# Patient Record
Sex: MALE | Race: WHITE | Employment: UNEMPLOYED | URBAN - METROPOLITAN AREA
[De-identification: names, ages, dates, MRNs, and addresses within clinical notes are randomized per-mention and may not be internally consistent; named-entity substitution may affect disease eponyms.]

---

## 2017-04-03 ENCOUNTER — ALLIED HEALTH/NURSE VISIT (OUTPATIENT)
Dept: NEUROLOGY | Facility: CLINIC | Age: 62
End: 2017-04-03

## 2017-04-03 ENCOUNTER — OFFICE VISIT (OUTPATIENT)
Dept: NEUROLOGY | Facility: CLINIC | Age: 62
End: 2017-04-03

## 2017-04-03 VITALS — WEIGHT: 139.5 LBS | DIASTOLIC BLOOD PRESSURE: 66 MMHG | HEART RATE: 66 BPM | SYSTOLIC BLOOD PRESSURE: 117 MMHG

## 2017-04-03 DIAGNOSIS — G40.909 SEIZURE DISORDER (H): Primary | ICD-10-CM

## 2017-04-03 DIAGNOSIS — R56.9 SEIZURES (H): Primary | ICD-10-CM

## 2017-04-03 LAB — PHENYTOIN SERPL-MCNC: 7.8 MG/L (ref 10–20)

## 2017-04-03 RX ORDER — ZONISAMIDE 100 MG/1
300 CAPSULE ORAL AT BEDTIME
Qty: 90 CAPSULE | Refills: 6 | Status: SHIPPED | OUTPATIENT
Start: 2017-04-03 | End: 2017-10-27

## 2017-04-03 RX ORDER — BISACODYL 10 MG
10 SUPPOSITORY, RECTAL RECTAL DAILY PRN
COMMUNITY

## 2017-04-03 RX ORDER — LEVETIRACETAM 750 MG/1
750 TABLET ORAL 2 TIMES DAILY
COMMUNITY
End: 2017-10-30

## 2017-04-03 RX ORDER — ALENDRONATE SODIUM 70 MG/1
70 TABLET ORAL
COMMUNITY

## 2017-04-03 NOTE — MR AVS SNAPSHOT
After Visit Summary   4/3/2017    Charles Lugo    MRN: [id number]           Patient Information     Date Of Birth          1955        Visit Information        Provider Department      4/3/2017 1:00 PM Monroe Escobedo MD [facility]        Today's Diagnoses     Seizure disorder (H)    -  1      Care Instructions      Times of Days  pm         Medication Tablet Size Number of Tablets/Capsules Total Daily Dosage   Zonegran (week 1, 2) 100  1          100   Zonegran (week 3, 4) 100  2          200   Zonegran (week 5 and after) 100  3          300                                                                                               Carry this with you at all times.  CONTINUE TAKING YOUR OTHER MEDICATIONS AS PREVIOUSLY DIRECTED.      * * *Do not store medications in the bathroom.  Keep medications away from children!* * *           Follow-ups after your visit        Follow-up notes from your care team     Return in about 3 months (around 7/3/2017).      Your next 10 appointments already scheduled     Jul 17, 2017 10:00 AM CDT   Return Visit with MD [facility] ([facility]    [address], [address]  [facility] [address] [phone]              Who to contact     Please call your clinic at [phone] to:    Ask questions about your health    Make or cancel appointments    Discuss your medicines    Learn about your test results    Speak to your doctor   If you have compliments or concerns about an experience at your clinic, or if you wish to file a complaint, please contact [facility] Patient Relations at [phone] or email us at [email or web address]         Additional Information About Your Visit        MyChart Information     [employer] is an electronic gateway that provides easy, online access to your medical records. With [employer], you can request a clinic appointment, read your test results,  renew a prescription or communicate with your care team.     To sign up for MyChart visit the website at [email or web address]   You will be asked to enter the access code listed below, as well as some personal information. Please follow the directions to create your username and password.     Your access code is: [email or web address]  Expires: 2017  2:04 PM     Your access code will  in 90 days. If you need help or a new code, please contact your [facility] or call [phone] for assistance.        Care EveryWhere ID     This is your Care EveryWhere ID. This could be used by other organizations to access your [place] medical records  [id number]        Your Vitals Were     Pulse                   66            Blood Pressure from Last 3 Encounters:   17 117/66    Weight from Last 3 Encounters:   17 139 lb 8 oz (63.3 kg)              We Performed the Following     Gabapentin level     Levetiracetam level     PHENYTOIN FREE     PHENYTOIN LEVEL          Today's Medication Changes          These changes are accurate as of: 4/3/17  2:16 PM.  If you have any questions, ask your nurse or doctor.               Start taking these medicines.        Dose/Directions    zonisamide 100 MG capsule   Commonly known as:  ZONEGRAN   Used for:  Seizure disorder (H)   Started by:  Monroe Escobedo MD        Dose:  300 mg   Take 3 capsules (300 mg) by mouth At Bedtime Start with 100 mg at bedtime for 2 weeks, then 200 mg at bedtime for 2 weeks, then 300 mg at bedtime.   Quantity:  90 capsule   Refills:  6            Where to get your medicines      Some of these will need a paper prescription and others can be bought over the counter.  Ask your nurse if you have questions.     Bring a paper prescription for each of these medications     zonisamide 100 MG capsule                Primary Care Provider Office Phone # Fax #    Vanessa Wolfe [phone]       [facility]  [facility] [address]    [facility] [address]        Thank you!     Thank you for choosing [facility]  for your care. Our goal is always to provide you with excellent care. Hearing back from our patients is one way we can continue to improve our services. Please take a few minutes to complete the written survey that you may receive in the mail after your visit with us. Thank you!             Your Updated Medication List - Protect others around you: Learn how to safely use, store and throw away your medicines at www.disposemymeds.org.          This list is accurate as of: 4/3/17  2:16 PM.  Always use your most recent med list.                   Brand Name Dispense Instructions for use    alendronate 70 MG tablet    FOSAMAX     Take 70 mg by mouth every 7 days       Calcium-Vitamin D 600-400 MG-UNIT Tabs          DILANTIN 30 MG CR capsule   Generic drug:  phenytoin      Take 160 mg by mouth daily Two 30 mg tabs in the am and two 30 mg tabs along with one 100 mg tab in the hs       FERREX 150 PO          FOLIC ACID PO      Take 1 mg by mouth daily       GABAPENTIN PO      Take 600 mg by mouth 3 times daily 1.5 tablets three times a day       gentian violet 1 % solution      Take 0.5 mLs by mouth as needed       levETIRAcetam 750 MG tablet    KEPPRA     Take 750 mg by mouth 2 times daily       LEVOTHYROXINE SODIUM PO      Take 88 mcg by mouth daily       MILK OF MAGNESIA PO          PANTOPRAZOLE SODIUM PO      Take 40 mg by mouth daily       PREPARATION H 0.25-14-74.9 % rectal ointment   Generic drug:  phenylephrine-shark liver oil-mineral oil-petrolatum      Place rectally 2 times daily as needed for hemorrhoids       study - bisacodyl 10 MG Suppository    IDS# [id number]     Place 10 mg rectally daily as needed for constipation       VITAMIN C PO      Take 500 mg by mouth daily       zonisamide 100 MG capsule    ZONEGRAN    90 capsule    Take 3 capsules (300 mg) by mouth At Bedtime Start with 100 mg at  bedtime for 2 weeks, then 200 mg at bedtime for 2 weeks, then 300 mg at bedtime.

## 2017-04-03 NOTE — PATIENT INSTRUCTIONS
Times of Days  pm         Medication Tablet Size Number of Tablets/Capsules Total Daily Dosage   Zonegran (week 1, 2) 100  1          100   Zonegran (week 3, 4) 100  2          200   Zonegran (week 5 and after) 100  3          300                                                                                               Carry this with you at all times.  CONTINUE TAKING YOUR OTHER MEDICATIONS AS PREVIOUSLY DIRECTED.      * * *Do not store medications in the bathroom.  Keep medications away from children!* * *

## 2017-04-03 NOTE — MR AVS SNAPSHOT
After Visit Summary   4/3/2017    Charles Lugo    MRN: [id number]           Patient Information     Date Of Birth          1955        Visit Information        Provider Department      4/3/2017 9:30 AM [facility] EEG 3 [facility]        Today's Diagnoses     Seizures (H)    -  1       Follow-ups after your visit        Who to contact     Please call your clinic at [phone] to:    Ask questions about your health    Make or cancel appointments    Discuss your medicines    Learn about your test results    Speak to your doctor   If you have compliments or concerns about an experience at your clinic, or if you wish to file a complaint, please contact [facility] Patient Relations at [phone] or email us at [email or web address]         Additional Information About Your Visit        MyChart Information     [employer] is an electronic gateway that provides easy, online access to your medical records. With [employer], you can request a clinic appointment, read your test results, renew a prescription or communicate with your care team.     To sign up for R[employer] Reportst visit the website at [email or web address]   You will be asked to enter the access code listed below, as well as some personal information. Please follow the directions to create your username and password.     Your access code is: [email or web address]  Expires: 2017  2:04 PM     Your access code will  in 90 days. If you need help or a new code, please contact your [facility] or call [phone] for assistance.        Care EveryWhere ID     This is your Care EveryWhere ID. This could be used by other organizations to access your [place] medical records  [id number]         Blood Pressure from Last 3 Encounters:   17 117/66    Weight from Last 3 Encounters:   17 139 lb 8 oz (63.3 kg)              Today, you had the following     No orders found for  display       Primary Care Provider Office Phone # Fax #    Vanessa STEWART [facility] [phone]       [facility] [address]   [facility] [address]        Thank you!     Thank you for choosing [facility]  for your care. Our goal is always to provide you with excellent care. Hearing back from our patients is one way we can continue to improve our services. Please take a few minutes to complete the written survey that you may receive in the mail after your visit with us. Thank you!             Your Updated Medication List - Protect others around you: Learn how to safely use, store and throw away your medicines at www.disposemymeds.org.          This list is accurate as of: 4/3/17  2:04 PM.  Always use your most recent med list.                   Brand Name Dispense Instructions for use    alendronate 70 MG tablet    FOSAMAX     Take 70 mg by mouth every 7 days       Calcium-Vitamin D 600-400 MG-UNIT Tabs          DILANTIN 30 MG CR capsule   Generic drug:  phenytoin      Take 160 mg by mouth daily Two 30 mg tabs in the am and two 30 mg tabs along with one 100 mg tab in the hs       FERREX 150 PO          FOLIC ACID PO      Take 1 mg by mouth daily       GABAPENTIN PO      Take 600 mg by mouth 3 times daily 1.5 tablets three times a day       gentian violet 1 % solution      Take 0.5 mLs by mouth as needed       levETIRAcetam 750 MG tablet    KEPPRA     Take 750 mg by mouth 2 times daily       LEVOTHYROXINE SODIUM PO      Take 88 mcg by mouth daily       MILK OF MAGNESIA PO          PANTOPRAZOLE SODIUM PO      Take 40 mg by mouth daily       PREPARATION H 0.25-14-74.9 % rectal ointment   Generic drug:  phenylephrine-shark liver oil-mineral oil-petrolatum      Place rectally 2 times daily as needed for hemorrhoids       study - bisacodyl 10 MG Suppository    IDS# [id number]     Place 10 mg rectally daily as needed for constipation       VITAMIN C PO      Take 500 mg by mouth daily

## 2017-04-03 NOTE — LETTER
4/3/2017       RE: Charles Lugo  : 1955   MRN: [id number]      Dear Colleague,    Thank you for referring your patient, Charles Lugo, to the [facility] at [facility]. Please see a copy of my visit note below.    April 3, 2017         Barrett Bliss DO   [facility]   [address], [address]   [place] [address]      RE: Charles Lugo   MRN: [id number]   : 1955      Dear Dr. Bliss:      I had the pleasure of seeing your patient, Mr. Charles Lugo, at [facility] today.  As you know, this patient is a 61-year-old male with a history of severe developmental delay and a longstanding intractable seizures.  He was previously seen by Dr. Kofi Xie at [place] and  recently left the area and you have seen this patient once and he is being referred for further evaluation and management to optimize his epilepsy care.  The patient is accompanied by 2 of his sisters and 1 of the caregivers from the group home.      The patient has a history of severe developmental delay.  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  From the limited information we obtained so far he has never had good seizure control in the past.  He has a long history of intractable seizures since the age of 4 years old.  He is currently taking Dilantin, Keppra and gabapentin for his seizure control.  He is on Dilantin 100-160, Keppra 1500 mg b.i.d. and gabapentin 900 mg t.i.d.  His caregiver and sisters do not know what other anti-seizure medications he had in the past.      According to the caregiver, the patient only has 1 type of seizure.  During a seizure, his head will drop suddenly and then he may make some noises.  Then his arms will go up.  He will make fists with hands.  His eyes will roll back and sometimes he will have some leg kicking.   Then he will have convulsions with upper and lower extremities and his body.  Usually it lasts for 10 seconds to 45 seconds in duration.  At present time it happens once to twice a week.  The caregiver felt that his seizure frequency has never changed significantly for the past 5 years since she was his main caregiver.      Caregiver did not notice any significant side effects from the medications because the patient is nonverbal.  The patient did not have any MRI scan of his brain in the past and it is unclear when he had his last EEG done.      TRIGGERS FOR SEIZURES:  Unclear.      RISK FACTORS FOR SEIZURES:  He has a history of trauma with severe developmental delay after birth.  No history of stroke.  No history of brain tumor.  No history of CNS infection.  No history of febrile convulsions.      Current Outpatient Prescriptions   Medication Sig Dispense Refill     study - bisacodyl (IDS# 4779) 10 MG Suppository Place 10 mg rectally daily as needed for constipation       Magnesium Hydroxide (MILK OF MAGNESIA PO)        alendronate (FOSAMAX) 70 MG tablet Take 70 mg by mouth every 7 days       phenylephrine-shark liver oil-mineral oil-petrolatum (PREPARATION H) 0.25-14-74.9 % rectal ointment Place rectally 2 times daily as needed for hemorrhoids       Calcium Carb-Cholecalciferol (CALCIUM-VITAMIN D) 600-400 MG-UNIT TABS        phenytoin (DILANTIN) 30 MG CR capsule Take 160 mg by mouth daily Two 30 mg tabs in the am and two 30 mg tabs along with one 100 mg tab in the hs       Polysaccharide Iron Complex (FERREX 150 PO)        FOLIC ACID PO Take 1 mg by mouth daily       GABAPENTIN PO Take 600 mg by mouth 3 times daily 1.5 tablets three times a day       levETIRAcetam (KEPPRA) 750 MG tablet Take 750 mg by mouth 2 times daily       LEVOTHYROXINE SODIUM PO Take 88 mcg by mouth daily       PANTOPRAZOLE SODIUM PO Take 40 mg by mouth daily       Ascorbic Acid (VITAMIN C PO) Take 500 mg by mouth daily       gentian  violet 1 % solution Take 0.5 mLs by mouth as needed       zonisamide (ZONEGRAN) 100 MG capsule Take 3 capsules (300 mg) by mouth At Bedtime Start with 100 mg at bedtime for 2 weeks, then 200 mg at bedtime for 2 weeks, then 300 mg at bedtime. 90 capsule 6     PAST AEDs:  Unclear.    PAST MEDICAL HISTORY:   1.  Intractable epilepsy.   2.  Severe developmental delay.      PAST SURGICAL HISTORY:  Teeth extraction.      ALLERGIES:  Debrox.      FAMILY HISTORY:  Positive for bone cancer in father.  Brother has history of hypertension.      SOCIAL HISTORY:  He lives in a group home.  No education in the past.  He is nonverbal and has no interaction with surroundings.  No smoking, no drugs, no alcohol.  He has 2 sisters for his main social support.      REVIEW OF SYSTEMS:  Positive for fever.  The rest of the 12-point review of systems is negative as per the caregiver.      PREVIOUS DIAGNOSTIC TESTING:  None available.      PHYSICAL EXAMINATION:   Blood pressure 117/66, pulse 66, weight 63.3 kg (139 lb 8 oz).    GENERAL:  Normocephalic, atraumatic.   NECK:  Supple.   EXTREMITIES:  No edema.     NEUROLOGIC:  The patient is alert, nonverbal, no interaction, no eye contact.  No eye tracking. Pupils are equal, round and reactive to light.  Fundi exam unable to observe.  No facial weakness.   MOTOR EXAM:  Moving all extremities spontaneously.  Strength cannot be tested.  Deep tendon reflexes 2+ in both patellar, biceps and brachioradialis.   COORDINATION:  Sensory exam not able to perform.   GAIT:  Not tested.  The patient is in a wheelchair.      IMPRESSION:     1.  Intractable epilepsy.  This patient is a 61-year-old male with a history of intractable epilepsy since age of 4 years old.  He had birth trauma and history of severe developmental delay.  His seizures are described as generalized tonic-clonic seizures by description.  Occurs once or twice a week.  He is currently taking Dilantin 100 mg in the morning, 160 in the  evening, Keppra 1500 mg twice daily and Gabapentin 900 mg t.i.d.      His EEG today showed moderate to severe generalized slowing with intermittent generalized epileptiform activities and occasional left hemisphere focal epileptiform activities.  At this time, it is difficult to say whether the patient has generalized epilepsy versus focal epilepsy.  It will be difficult to obtain MRI scan from the patient because he is constantly moving and not following commands.  I would like to add zonisamide to his regimen to see if this can control his seizures better.      2.  Severe developmental delay, stable.      PLAN:   1.  Continue Dilantin 100-160, Keppra 1500 mg b.i.d. and Gabapentin 900 mg t.i.d.   2.  Check Keppra, Dilantin and gabapentin levels.   3.  Add Zonegran 100 mg each day at bedtime for 2 weeks, then 200 mg each day at bedtime for 2 weeks, then 300 mg each day at bedtime.   4.  Return to clinic in 3 months.      Sincerely,       70 min was spent on the visit.  Over 50% of the time was spent on education, counseling about optimal seizure control and coordination of care.         JOAQUIM BROOKS MD             D: 2017 14:40   T: 2017 15:45   MT: nh      Name:     NUSRAT GU   MRN:      [id number]        Account:      [id number]   :      1955           Service Date: 2017      Document: [id number]

## 2017-04-03 NOTE — PROGRESS NOTES
April 3, 2017         Barrett Bliss DO   [facility] - Neurology   [address], [address]   [place] [address]      RE: Charles Lugo   MRN: [id number]   : 1955      Dear Dr. Bliss:      I had the pleasure of seeing your patient, Mr. Chalres Lugo, at [facility] today.  As you know, this patient is a 61-year-old male with a history of severe developmental delay and a longstanding intractable seizures.  He was previously seen by Dr. Kofi Xie at [place] and  recently left the area and you have seen this patient once and he is being referred for further evaluation and management to optimize his epilepsy care.  The patient is accompanied by 2 of his sisters and 1 of the caregivers from the group home.      The patient has a history of severe developmental delay.  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  From the limited information we obtained so far he has never had good seizure control in the past.  He has a long history of intractable seizures since the age of 4 years old.  He is currently taking Dilantin, Keppra and gabapentin for his seizure control.  He is on Dilantin 100-160, Keppra 1500 mg b.i.d. and gabapentin 900 mg t.i.d.  His caregiver and sisters do not know what other anti-seizure medications he had in the past.      According to the caregiver, the patient only has 1 type of seizure.  During a seizure, his head will drop suddenly and then he may make some noises.  Then his arms will go up.  He will make fists with hands.  His eyes will roll back and sometimes he will have some leg kicking.  Then he will have convulsions with upper and lower extremities and his body.  Usually it lasts for 10 seconds to 45 seconds in duration.  At present time it happens once to twice a week.  The caregiver felt that his seizure frequency has never changed significantly for the  past 5 years since she was his main caregiver.      Caregiver did not notice any significant side effects from the medications because the patient is nonverbal.  The patient did not have any MRI scan of his brain in the past and it is unclear when he had his last EEG done.      TRIGGERS FOR SEIZURES:  Unclear.      RISK FACTORS FOR SEIZURES:  He has a history of trauma with severe developmental delay after birth.  No history of stroke.  No history of brain tumor.  No history of CNS infection.  No history of febrile convulsions.      Current Outpatient Prescriptions   Medication Sig Dispense Refill     study - bisacodyl (IDS# 4779) 10 MG Suppository Place 10 mg rectally daily as needed for constipation       Magnesium Hydroxide (MILK OF MAGNESIA PO)        alendronate (FOSAMAX) 70 MG tablet Take 70 mg by mouth every 7 days       phenylephrine-shark liver oil-mineral oil-petrolatum (PREPARATION H) 0.25-14-74.9 % rectal ointment Place rectally 2 times daily as needed for hemorrhoids       Calcium Carb-Cholecalciferol (CALCIUM-VITAMIN D) 600-400 MG-UNIT TABS        phenytoin (DILANTIN) 30 MG CR capsule Take 160 mg by mouth daily Two 30 mg tabs in the am and two 30 mg tabs along with one 100 mg tab in the hs       Polysaccharide Iron Complex (FERREX 150 PO)        FOLIC ACID PO Take 1 mg by mouth daily       GABAPENTIN PO Take 600 mg by mouth 3 times daily 1.5 tablets three times a day       levETIRAcetam (KEPPRA) 750 MG tablet Take 750 mg by mouth 2 times daily       LEVOTHYROXINE SODIUM PO Take 88 mcg by mouth daily       PANTOPRAZOLE SODIUM PO Take 40 mg by mouth daily       Ascorbic Acid (VITAMIN C PO) Take 500 mg by mouth daily       gentian violet 1 % solution Take 0.5 mLs by mouth as needed       zonisamide (ZONEGRAN) 100 MG capsule Take 3 capsules (300 mg) by mouth At Bedtime Start with 100 mg at bedtime for 2 weeks, then 200 mg at bedtime for 2 weeks, then 300 mg at bedtime. 90 capsule 6     PAST AEDs:   Unclear.    PAST MEDICAL HISTORY:   1.  Intractable epilepsy.   2.  Severe developmental delay.      PAST SURGICAL HISTORY:  Teeth extraction.      ALLERGIES:  Debrox.      FAMILY HISTORY:  Positive for bone cancer in father.  Brother has history of hypertension.      SOCIAL HISTORY:  He lives in a group home.  No education in the past.  He is nonverbal and has no interaction with surroundings.  No smoking, no drugs, no alcohol.  He has 2 sisters for his main social support.      REVIEW OF SYSTEMS:  Positive for fever.  The rest of the 12-point review of systems is negative as per the caregiver.      PREVIOUS DIAGNOSTIC TESTING:  None available.      PHYSICAL EXAMINATION:   Blood pressure 117/66, pulse 66, weight 63.3 kg (139 lb 8 oz).    GENERAL:  Normocephalic, atraumatic.   NECK:  Supple.   EXTREMITIES:  No edema.     NEUROLOGIC:  The patient is alert, nonverbal, no interaction, no eye contact.  No eye tracking. Pupils are equal, round and reactive to light.  Fundi exam unable to observe.  No facial weakness.   MOTOR EXAM:  Moving all extremities spontaneously.  Strength cannot be tested.  Deep tendon reflexes 2+ in both patellar, biceps and brachioradialis.   COORDINATION:  Sensory exam not able to perform.   GAIT:  Not tested.  The patient is in a wheelchair.      IMPRESSION:     1.  Intractable epilepsy.  This patient is a 61-year-old male with a history of intractable epilepsy since age of 4 years old.  He had birth trauma and history of severe developmental delay.  His seizures are described as generalized tonic-clonic seizures by description.  Occurs once or twice a week.  He is currently taking Dilantin 100 mg in the morning, 160 in the evening, Keppra 1500 mg twice daily and Gabapentin 900 mg t.i.d.      His EEG today showed moderate to severe generalized slowing with intermittent generalized epileptiform activities and occasional left hemisphere focal epileptiform activities.  At this time, it is  difficult to say whether the patient has generalized epilepsy versus focal epilepsy.  It will be difficult to obtain MRI scan from the patient because he is constantly moving and not following commands.  I would like to add zonisamide to his regimen to see if this can control his seizures better.      2.  Severe developmental delay, stable.      PLAN:   1.  Continue Dilantin 100-160, Keppra 1500 mg b.i.d. and Gabapentin 900 mg t.i.d.   2.  Check Keppra, Dilantin and gabapentin levels.   3.  Add Zonegran 100 mg each day at bedtime for 2 weeks, then 200 mg each day at bedtime for 2 weeks, then 300 mg each day at bedtime.   4.  Return to clinic in 3 months.      Sincerely,       70 min was spent on the visit.  Over 50% of the time was spent on education, counseling about optimal seizure control and coordination of care.          MD JOAQUIM Mott MD             D: 2017 14:40   T: 2017 15:45   MT: [id number]      Name:     NUSRAT GU   MRN:      [id number]        Account:      [id number]   :      1955           Service Date: 2017      Document: [id number]

## 2017-04-04 LAB
GABAPENTIN SERPLBLD-MCNC: 11.9 UG/ML
LEVETIRACETAM SERPL-MCNC: 54 UG/ML
PHENYTOIN FREE SERPL-MCNC: 0.58 UG/ML

## 2017-04-04 NOTE — PROCEDURES
EEG #:  [id number].      DATE OF RECORDIN2017.        DURATION OF RECORDIN hours and 35 minutes.      CLINICAL HISTORY:  This patient is a 61-year-old male with a long history of intractable seizures.  EEG was requested for followup study.      CURRENT MEDICATIONS:     1. Keppra.      TECHNICAL SUMMARY: This continuous video- EEG monitoring procedure was performed with 23 scalp electrodes in 10-20 electrode system placements, and additional scalp, precordial and other surface electrodes used for electrical referencing and artifact detection.  Video monitoring was utilized and periodically reviewed by EEG technologists and the physician for electroclinical correlations.     BACKGROUND ACTIVITIES:  The background activity of this EEG was symmetric and consisted of moderate to severe generalized slowing with mixed theta and delta activities.  No clear posterior dominant rhythm was observed during this recording.  Hyperventilation was not performed.  Photic stimulation produced no driving responses.      Sleep stages were recorded with poorly formed sleep architectures.      There were intermittent generalized epileptiform activities throughout the recording, mostly during sleep.  There were also occasional left temporal spikes.      No clinical or electrographic seizures were observed during this recording.      IMPRESSION:  This is a markedly abnormal EEG due to the presence of moderate to severe generalized slowing of the background activities.  There were also intermittent generalized and left temporal focal epileptiform activities during this recording.  These findings are diagnostic of an epileptic disorder.  However, it is unclear at this time whether the patient has generalized epilepsy versus focal epilepsy.  Clinical correlation is advised.         JOAQUIM BROOKS MD             D: 2017 13:32   T: 2017 14:01   MT: tb      Name:     NUSRAT GU   MRN:      [id number]        Account:         [id number]   :      1955           Procedure Date: 2017      Document: [id number]

## 2017-07-17 ENCOUNTER — OFFICE VISIT (OUTPATIENT)
Dept: NEUROLOGY | Facility: CLINIC | Age: 62
End: 2017-07-17

## 2017-07-17 VITALS — DIASTOLIC BLOOD PRESSURE: 70 MMHG | HEART RATE: 70 BPM | SYSTOLIC BLOOD PRESSURE: 108 MMHG | WEIGHT: 134 LBS

## 2017-07-17 DIAGNOSIS — G40.909 SEIZURE DISORDER (H): Primary | ICD-10-CM

## 2017-07-17 NOTE — PROGRESS NOTES
"CC: Seizures    HPI: Mr. Charles Lugo returns for follow up.  He is a 61-year-old male with a history of severe developmental delay and a longstanding intractable seizures.  He was previously seen by Dr. Kofi Xie at [place] and Dr. Bliss in [facility] and he was being referred for further evaluation and management to optimize his epilepsy care.  The patient is accompanied by his sister and 2 of the caregivers from the group home.  Patient was added with Zonegran since the last visit.  He is now on Zonegran 300mg qhs.  He is also on Dilantin 100-160, Keppra 1500 mg b.i.d. and Gabapentin 900 mg t.i.d. Since we started on Zonegran, his seizures improved.  He was having 17 seizure/3 months before Zonegran, now he is having 10 seizures/3 months.  Caregiver reported patient has a strong \"urrine smell\" since starte don Zonegran.  No other side effects were reported.     The patient has a history of severe developmental delay.  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  From the limited information we obtained so far he has never had good seizure control in the past.  He has a long history of intractable seizures since the age of 4 years old.  He is currently taking Dilantin, Keppra and gabapentin for his seizure control.  He is on Dilantin 100-160, Keppra 1500 mg b.i.d. and gabapentin 900 mg t.i.d.  His caregiver and sisters do not know what other anti-seizure medications he had in the past.      According to the caregiver, the patient only has 1 type of seizure.  During a seizure, his head will drop suddenly and then he may make some noises.  Then his arms will go up.  He will make fists with hands.  His eyes will roll back and sometimes he will have some leg kicking.  Then he will have convulsions with upper and lower extremities and his body.  Usually it lasts for 10 seconds to 45 seconds in duration.  At present time it " happens once to twice a week.  The caregiver felt that his seizure frequency has never changed significantly for the past 5 years since she was his main caregiver.      Caregiver did not notice any significant side effects from the medications because the patient is nonverbal.  The patient did not have any MRI scan of his brain in the past and it is unclear when he had his last EEG done.      TRIGGERS FOR SEIZURES:  Unclear.      RISK FACTORS FOR SEIZURES:  He has a history of trauma with severe developmental delay after birth.  No history of stroke.  No history of brain tumor.  No history of CNS infection.  No history of febrile convulsions.      Current Outpatient Prescriptions   Medication Sig Dispense Refill     study - bisacodyl (IDS# 4779) 10 MG Suppository Place 10 mg rectally daily as needed for constipation       Magnesium Hydroxide (MILK OF MAGNESIA PO)        alendronate (FOSAMAX) 70 MG tablet Take 70 mg by mouth every 7 days       phenylephrine-shark liver oil-mineral oil-petrolatum (PREPARATION H) 0.25-14-74.9 % rectal ointment Place rectally 2 times daily as needed for hemorrhoids       Calcium Carb-Cholecalciferol (CALCIUM-VITAMIN D) 600-400 MG-UNIT TABS        phenytoin (DILANTIN) 30 MG CR capsule Take 160 mg by mouth daily Two 30 mg tabs in the am and two 30 mg tabs along with one 100 mg tab in the hs       Polysaccharide Iron Complex (FERREX 150 PO)        FOLIC ACID PO Take 1 mg by mouth daily       GABAPENTIN PO Take 600 mg by mouth 3 times daily 1.5 tablets three times a day       levETIRAcetam (KEPPRA) 750 MG tablet Take 750 mg by mouth 2 times daily       LEVOTHYROXINE SODIUM PO Take 88 mcg by mouth daily       PANTOPRAZOLE SODIUM PO Take 40 mg by mouth daily       Ascorbic Acid (VITAMIN C PO) Take 500 mg by mouth daily       gentian violet 1 % solution Take 0.5 mLs by mouth as needed       zonisamide (ZONEGRAN) 100 MG capsule Take 3 capsules (300 mg) by mouth At Bedtime Start with 100 mg at  "bedtime for 2 weeks, then 200 mg at bedtime for 2 weeks, then 300 mg at bedtime. (Patient taking differently: Take 300 mg by mouth At Bedtime 300 mg at bedtime.) 90 capsule 6     PAST AEDs:  Unclear.    PAST MEDICAL HISTORY:   1.  Intractable epilepsy.   2.  Severe developmental delay.      PAST SURGICAL HISTORY:  Teeth extraction.      ALLERGIES:  Debrox.      FAMILY HISTORY:  Positive for bone cancer in father.  Brother has history of hypertension.      SOCIAL HISTORY:  He lives in a group home.  No education in the past.  He is nonverbal and has no interaction with surroundings.  No smoking, no drugs, no alcohol.  He has 2 sisters for his main social support.      REVIEW OF SYSTEMS:  Positive for \"strong urine smell\".  The rest of the 8-point review of systems is negative as per the caregiver.      PREVIOUS DIAGNOSTIC TESTING:     EEG (4/3/2017)   IMPRESSION:  This is a markedly abnormal EEG due to the presence of moderate to severe generalized slowing of the background activities.  There were also intermittent generalized and left temporal focal epileptiform activities during this recording.  These findings are diagnostic of an epileptic disorder.  However, it is unclear at this time whether the patient has generalized epilepsy versus focal epilepsy.     Component      Latest Ref Rng & Units 4/3/2017   Phenytoin Level      10 - 20 mg/L 7.8 (L)   Phenytoin Free       0.58 (L)   Levetiracetam Level       54.0 (H)   Gabapentin Level       11.9       PHYSICAL EXAMINATION:   Blood pressure 108/70, pulse 70, weight 134 lb (60.8 kg).    GENERAL:  Normocephalic, atraumatic.   NECK:  Supple.   EXTREMITIES:  No edema.     NEUROLOGIC:  The patient is alert, nonverbal, no interaction, no eye contact.  No eye tracking. Pupils are equal, round and reactive to light.  Fundi exam unable to observe.  No facial weakness.   MOTOR EXAM:  Moving all extremities spontaneously.  Strength cannot be tested.  Deep tendon reflexes 2+ in " "both patellar, biceps and brachioradialis.   COORDINATION:  Sensory exam not able to perform.   GAIT:  Not tested.  The patient is in a wheelchair.      IMPRESSION:     1.  Intractable epilepsy.  He is a 61-year-old male with a history of severe developmental delay and a longstanding intractable seizures.  He was previously seen by Dr. Kofi Xie at [place] and Dr. Bliss in [facility] and he was being referred for further evaluation and management to optimize his epilepsy care.  The patient is accompanied by his sister and 2 of the caregivers from the group home.  Patient was added with Zonegran since the last visit.  He is now on Zonegran 300mg qhs.  He is also on Dilantin 100-160, Keppra 1500 mg b.i.d. and Gabapentin 900 mg t.i.d. Since we started on Zonegran, his seizures improved.  He was having 17 seizure/3 months before Zonegran, now he is having 10 seizures/3 months.  Caregiver reported patient has a strong \"urrine smell\" since starte don Zonegran.  No other side effects were reported.  Caregivers are pleased with the current seizure control.     2.  Severe developmental delay, stable.      PLAN:   1.  Continue Dilantin 100-160, Keppra 1500 mg b.i.d. and Gabapentin 900 mg t.i.d.  Zonegran 300 mg qhs.  2.  Check Keppra, Dilantin and gabapentin levels.   3.  If patient continue to have frequent seizures, options in the future include increasing Dilantin or Zonegran. Sister would like to get him off some AEDs if possible.  We may consider taper Gabapentin in the future.  4.  Return to clinic in 3 months.       30 min was spent on the visit.  Over 50% of the time was spent on education, counseling about optimal seizure control and coordination of care.      "

## 2017-07-17 NOTE — LETTER
"2017       RE: Charles Lugo  : 1955   MRN: [id number]      Dear Colleague,    Thank you for referring your patient, Charles Lugo, to the [facility]PI[facility]ARE at [facility]. Please see a copy of my visit note below.    CC: Seizures    HPI: Mr. Charles Lugo returns for follow up.  He is a 61-year-old male with a history of severe developmental delay and a longstanding intractable seizures.  He was previously seen by Dr. Kofi Xie at [facility]. Bliss in [facility] and he was being referred for further evaluation and management to optimize his epilepsy care.  The patient is accompanied by his sister and 2 of the caregivers from the group home.  Patient was added with Zonegran since the last visit.  He is now on Zonegran 300mg qhs.  He is also on Dilantin 100-160, Keppra 1500 mg b.i.d. and Gabapentin 900 mg t.i.d. Since we started on Zonegran, his seizures improved.  He was having 17 seizure/3 months before Zonegran, now he is having 10 seizures/3 months.  Caregiver reported patient has a strong \"urrine smell\" since starte don Zonegran.  No other side effects were reported.     The patient has a history of severe developmental delay.  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  From the limited information we obtained so far he has never had good seizure control in the past.  He has a long history of intractable seizures since the age of 4 years old.  He is currently taking Dilantin, Keppra and gabapentin for his seizure control.  He is on Dilantin 100-160, Keppra 1500 mg b.i.d. and gabapentin 900 mg t.i.d.  His caregiver and sisters do not know what other anti-seizure medications he had in the past.      According to the caregiver, the patient only has 1 type of seizure.  During a seizure, his head will drop suddenly and then he may make some noises.  Then his arms " will go up.  He will make fists with hands.  His eyes will roll back and sometimes he will have some leg kicking.  Then he will have convulsions with upper and lower extremities and his body.  Usually it lasts for 10 seconds to 45 seconds in duration.  At present time it happens once to twice a week.  The caregiver felt that his seizure frequency has never changed significantly for the past 5 years since she was his main caregiver.      Caregiver did not notice any significant side effects from the medications because the patient is nonverbal.  The patient did not have any MRI scan of his brain in the past and it is unclear when he had his last EEG done.      TRIGGERS FOR SEIZURES:  Unclear.      RISK FACTORS FOR SEIZURES:  He has a history of trauma with severe developmental delay after birth.  No history of stroke.  No history of brain tumor.  No history of CNS infection.  No history of febrile convulsions.      Current Outpatient Prescriptions   Medication Sig Dispense Refill     study - bisacodyl (IDS# 4779) 10 MG Suppository Place 10 mg rectally daily as needed for constipation       Magnesium Hydroxide (MILK OF MAGNESIA PO)        alendronate (FOSAMAX) 70 MG tablet Take 70 mg by mouth every 7 days       phenylephrine-shark liver oil-mineral oil-petrolatum (PREPARATION H) 0.25-14-74.9 % rectal ointment Place rectally 2 times daily as needed for hemorrhoids       Calcium Carb-Cholecalciferol (CALCIUM-VITAMIN D) 600-400 MG-UNIT TABS        phenytoin (DILANTIN) 30 MG CR capsule Take 160 mg by mouth daily Two 30 mg tabs in the am and two 30 mg tabs along with one 100 mg tab in the hs       Polysaccharide Iron Complex (FERREX 150 PO)        FOLIC ACID PO Take 1 mg by mouth daily       GABAPENTIN PO Take 600 mg by mouth 3 times daily 1.5 tablets three times a day       levETIRAcetam (KEPPRA) 750 MG tablet Take 750 mg by mouth 2 times daily       LEVOTHYROXINE SODIUM PO Take 88 mcg by mouth daily       PANTOPRAZOLE  "SODIUM PO Take 40 mg by mouth daily       Ascorbic Acid (VITAMIN C PO) Take 500 mg by mouth daily       gentian violet 1 % solution Take 0.5 mLs by mouth as needed       zonisamide (ZONEGRAN) 100 MG capsule Take 3 capsules (300 mg) by mouth At Bedtime Start with 100 mg at bedtime for 2 weeks, then 200 mg at bedtime for 2 weeks, then 300 mg at bedtime. (Patient taking differently: Take 300 mg by mouth At Bedtime 300 mg at bedtime.) 90 capsule 6     PAST AEDs:  Unclear.    PAST MEDICAL HISTORY:   1.  Intractable epilepsy.   2.  Severe developmental delay.      PAST SURGICAL HISTORY:  Teeth extraction.      ALLERGIES:  Debrox.      FAMILY HISTORY:  Positive for bone cancer in father.  Brother has history of hypertension.      SOCIAL HISTORY:  He lives in a group home.  No education in the past.  He is nonverbal and has no interaction with surroundings.  No smoking, no drugs, no alcohol.  He has 2 sisters for his main social support.      REVIEW OF SYSTEMS:  Positive for \"strong urine smell\".  The rest of the 8-point review of systems is negative as per the caregiver.      PREVIOUS DIAGNOSTIC TESTING:     EEG (4/3/2017)   IMPRESSION:  This is a markedly abnormal EEG due to the presence of moderate to severe generalized slowing of the background activities.  There were also intermittent generalized and left temporal focal epileptiform activities during this recording.  These findings are diagnostic of an epileptic disorder.  However, it is unclear at this time whether the patient has generalized epilepsy versus focal epilepsy.     Component      Latest Ref Rng & Units 4/3/2017   Phenytoin Level      10 - 20 mg/L 7.8 (L)   Phenytoin Free       0.58 (L)   Levetiracetam Level       54.0 (H)   Gabapentin Level       11.9       PHYSICAL EXAMINATION:   Blood pressure 108/70, pulse 70, weight 134 lb (60.8 kg).    GENERAL:  Normocephalic, atraumatic.   NECK:  Supple.   EXTREMITIES:  No edema.     NEUROLOGIC:  The patient is " "alert, nonverbal, no interaction, no eye contact.  No eye tracking. Pupils are equal, round and reactive to light.  Fundi exam unable to observe.  No facial weakness.   MOTOR EXAM:  Moving all extremities spontaneously.  Strength cannot be tested.  Deep tendon reflexes 2+ in both patellar, biceps and brachioradialis.   COORDINATION:  Sensory exam not able to perform.   GAIT:  Not tested.  The patient is in a wheelchair.      IMPRESSION:     1.  Intractable epilepsy.  He is a 61-year-old male with a history of severe developmental delay and a longstanding intractable seizures.  He was previously seen by Dr. Koif Xie at [facility] and Dr. Bliss in [facility] and he was being referred for further evaluation and management to optimize his epilepsy care.  The patient is accompanied by his sister and 2 of the caregivers from the group home.  Patient was added with Zonegran since the last visit.  He is now on Zonegran 300mg qhs.  He is also on Dilantin 100-160, Keppra 1500 mg b.i.d. and Gabapentin 900 mg t.i.d. Since we started on Zonegran, his seizures improved.  He was having 17 seizure/3 months before Zonegran, now he is having 10 seizures/3 months.  Caregiver reported patient has a strong \"urrine smell\" since starte don Zonegran.  No other side effects were reported.  Caregivers are pleased with the current seizure control.     2.  Severe developmental delay, stable.      PLAN:   1.  Continue Dilantin 100-160, Keppra 1500 mg b.i.d. and Gabapentin 900 mg t.i.d.  Zonegran 300 mg qhs.  2.  Check Keppra, Dilantin and gabapentin levels.   3.  If patient continue to have frequent seizures, options in the future include increasing Dilantin or Zonegran. Sister would like to get him off some AEDs if possible.  We may consider taper Gabapentin in the future.  4.  Return to clinic in 3 months.       30 min was spent on the visit.  Over 50% of the time was spent on education, counseling about optimal seizure control and " coordination of care.      Monroe Escobedo MD

## 2017-07-17 NOTE — MR AVS SNAPSHOT
After Visit Summary   7/17/2017    Charles Lugo    MRN: [id number]           Patient Information     Date Of Birth          1955        Visit Information        Provider Department      7/17/2017 10:00 AM Monroe Escobedo MD [facility]        Today's Diagnoses     Seizure disorder (H)    -  1       Follow-ups after your visit        Follow-up notes from your care team     Return in about 6 months (around 1/17/2018).      Your next 10 appointments already scheduled     Oct 30, 2017 10:00 AM CDT   Return Visit with MD [facility] ([facility])    [address], [address]  [facility] [address] [phone]              Future tests that were ordered for you today     Open Future Orders        Priority Expected Expires Ordered    Gabapentin level Routine  7/17/2018 7/17/2017            Who to contact     Please call your clinic at [phone] to:    Ask questions about your health    Make or cancel appointments    Discuss your medicines    Learn about your test results    Speak to your doctor   If you have compliments or concerns about an experience at your clinic, or if you wish to file a complaint, please contact [facility] Patient Relations at [phone] or email us at [email or web address]         Additional Information About Your Visit        MyChart Information     [employer] is an electronic gateway that provides easy, online access to your medical records. With [employer], you can request a clinic appointment, read your test results, renew a prescription or communicate with your care team.     To sign up for [employer] visit the website at [email or web address]   You will be asked to enter the access code listed below, as well as some personal information. Please follow the directions to create your username and password.     Your access code is: [email or web address]  Expires: 10/15/2017 10:59 AM     Your  access code will  in 90 days. If you need help or a new code, please contact your [facility] or call [phone] for assistance.        Care EveryWhere ID     This is your Care EveryWhere ID. This could be used by other organizations to access your [place] medical records  [id number]        Your Vitals Were     Pulse                   70            Blood Pressure from Last 3 Encounters:   17 108/70   17 117/66    Weight from Last 3 Encounters:   17 134 lb (60.8 kg)   17 139 lb 8 oz (63.3 kg)              We Performed the Following     Keppra (Levetiracetam) Level     Phenytoin free     Phenytoin level     Zonisamide Level Quantitative          Today's Medication Changes          These changes are accurate as of: 17 10:59 AM.  If you have any questions, ask your nurse or doctor.               These medicines have changed or have updated prescriptions.        Dose/Directions    zonisamide 100 MG capsule   Commonly known as:  ZONEGRAN   This may have changed:  additional instructions   Used for:  Seizure disorder (H)        Dose:  300 mg   Take 3 capsules (300 mg) by mouth At Bedtime Start with 100 mg at bedtime for 2 weeks, then 200 mg at bedtime for 2 weeks, then 300 mg at bedtime.   Quantity:  90 capsule   Refills:  6                Primary Care Provider Office Phone # Fax #    Vanessa STEWART [facility] [phone]       [facility] [address] [facility] [address] [place], [place] [id number]        Equal Access to Services     MARCY GONZALES AH: Hadii steven coheno Somariusz, waaxda luqadaha, qaybta kaalmada adeegyada, brandt linares. So [facility] [phone].    ATENCIÓN: Si habla español, tiene a pool disposición servicios gratuitos de asistencia lingüística. Llangelo al [phone].    We comply with applicable federal civil rights laws and [place] laws. We do not discriminate on the basis of race, color, national origin,  age, disability sex, sexual orientation or gender identity.            Thank you!     Thank you for choosing [facility]  for your care. Our goal is always to provide you with excellent care. Hearing back from our patients is one way we can continue to improve our services. Please take a few minutes to complete the written survey that you may receive in the mail after your visit with us. Thank you!             Your Updated Medication List - Protect others around you: Learn how to safely use, store and throw away your medicines at www.disposemymeds.org.          This list is accurate as of: 7/17/17 10:59 AM.  Always use your most recent med list.                   Brand Name Dispense Instructions for use Diagnosis    alendronate 70 MG tablet    FOSAMAX     Take 70 mg by mouth every 7 days        Calcium-Vitamin D 600-400 MG-UNIT Tabs           DILANTIN 30 MG CR capsule   Generic drug:  phenytoin      Take 160 mg by mouth daily Two 30 mg tabs in the am and two 30 mg tabs along with one 100 mg tab in the hs        FERREX 150 PO           FOLIC ACID PO      Take 1 mg by mouth daily        GABAPENTIN PO      Take 600 mg by mouth 3 times daily 1.5 tablets three times a day        gentian violet 1 % solution      Take 0.5 mLs by mouth as needed        levETIRAcetam 750 MG tablet    KEPPRA     Take 750 mg by mouth 2 times daily        LEVOTHYROXINE SODIUM PO      Take 88 mcg by mouth daily        MILK OF MAGNESIA PO           PANTOPRAZOLE SODIUM PO      Take 40 mg by mouth daily        PREPARATION H 0.25-14-74.9 % rectal ointment   Generic drug:  phenylephrine-shark liver oil-mineral oil-petrolatum      Place rectally 2 times daily as needed for hemorrhoids        study - bisacodyl 10 MG Suppository    IDS# 4779     Place 10 mg rectally daily as needed for constipation        VITAMIN C PO      Take 500 mg by mouth daily        zonisamide 100 MG capsule    ZONEGRAN    90 capsule    Take 3 capsules (300 mg) by mouth  At Bedtime Start with 100 mg at bedtime for 2 weeks, then 200 mg at bedtime for 2 weeks, then 300 mg at bedtime.    Seizure disorder (H)

## 2017-07-18 LAB — PHENYTOIN SERPL-MCNC: 17.8 MG/L (ref 10–20)

## 2017-07-19 LAB
LEVETIRACETAM SERPL-MCNC: 56 UG/ML
PHENYTOIN FREE SERPL-MCNC: 1.26 UG/ML
ZONISAMIDE SERPL-MCNC: 12 UG/ML

## 2017-08-28 ENCOUNTER — TELEPHONE (OUTPATIENT)
Dept: NEUROLOGY | Facility: CLINIC | Age: 62
End: 2017-08-28

## 2017-08-28 DIAGNOSIS — G40.909 SEIZURE SYNDROME (H): Primary | ICD-10-CM

## 2017-08-28 RX ORDER — PHENYTOIN SODIUM 100 MG/1
CAPSULE, EXTENDED RELEASE ORAL
Qty: 60 CAPSULE | Refills: 3 | Status: SHIPPED | OUTPATIENT
Start: 2017-08-28 | End: 2017-10-30

## 2017-08-28 NOTE — TELEPHONE ENCOUNTER
Results for NUSRAT GU (MRN [id number]) as of 8/28/2017 10:11   Ref. Range 4/3/2017 14:05 4/4/2017 14:09 7/17/2017 10:45   Gabapentin Level Unknown 11.9     Keppra (Levetiracetam) Level Unknown   56 (H)   Levetiracetam Level Unknown 54.0 (H)     Phenytoin Level Latest Ref Range: 10 - 20 mg/L 7.8 (L)  17.8   Phenytoin Free Unknown 0.58 (L)  1.26   Zonisamide Level Quant Unknown   12

## 2017-08-28 NOTE — TELEPHONE ENCOUNTER
----- Message from Alona Ramirez CMA sent at 8/28/2017  9:44 AM CDT -----  Regarding: refill  Patient is taking Phenytoin (Generic) 100mg capsule, taking 1 cap AM and 1 cap at PM, along with two 30mg caps at PM.     This was initially prescribe by referring's office. Patient is using this for seizure related. Please continue the refill as pt is following care with Dr Osbaldo [facility] - [place], [place] - [address]    RTC: 10/30/17

## 2017-10-27 ENCOUNTER — TELEPHONE (OUTPATIENT)
Dept: NEUROLOGY | Facility: CLINIC | Age: 62
End: 2017-10-27

## 2017-10-27 DIAGNOSIS — G40.909 SEIZURE DISORDER (H): ICD-10-CM

## 2017-10-27 RX ORDER — ZONISAMIDE 100 MG/1
CAPSULE ORAL
Qty: 90 CAPSULE | Refills: 0 | Status: SHIPPED | OUTPATIENT
Start: 2017-10-27 | End: 2017-10-30

## 2017-10-30 ENCOUNTER — OFFICE VISIT (OUTPATIENT)
Dept: NEUROLOGY | Facility: CLINIC | Age: 62
End: 2017-10-30

## 2017-10-30 VITALS — DIASTOLIC BLOOD PRESSURE: 61 MMHG | HEART RATE: 71 BPM | SYSTOLIC BLOOD PRESSURE: 103 MMHG

## 2017-10-30 DIAGNOSIS — G40.909 SEIZURE SYNDROME (H): ICD-10-CM

## 2017-10-30 DIAGNOSIS — G40.909 SEIZURE DISORDER (H): ICD-10-CM

## 2017-10-30 RX ORDER — ZONISAMIDE 100 MG/1
CAPSULE ORAL
Qty: 90 CAPSULE | Refills: 11 | Status: SHIPPED | OUTPATIENT
Start: 2017-10-30 | End: 2018-05-14

## 2017-10-30 RX ORDER — LEVETIRACETAM 750 MG/1
750 TABLET ORAL 2 TIMES DAILY
Qty: 120 TABLET | Refills: 11 | Status: SHIPPED | OUTPATIENT
Start: 2017-10-30 | End: 2017-11-29

## 2017-10-30 RX ORDER — PHENYTOIN SODIUM 100 MG/1
CAPSULE, EXTENDED RELEASE ORAL
Qty: 60 CAPSULE | Refills: 11 | Status: SHIPPED | OUTPATIENT
Start: 2017-10-30 | End: 2018-05-14

## 2017-10-30 RX ORDER — GABAPENTIN 300 MG/1
300 CAPSULE ORAL 3 TIMES DAILY
Qty: 90 CAPSULE | Refills: 11 | Status: SHIPPED | OUTPATIENT
Start: 2017-10-30 | End: 2018-05-14

## 2017-10-30 NOTE — MR AVS SNAPSHOT
After Visit Summary   10/30/2017    Charles Lugo    MRN: [id number]           Patient Information     Date Of Birth          1955        Visit Information        Provider Department      10/30/2017 10:00 AM Monroe Escobedo MD [facility]        Today's Diagnoses     Seizure disorder (H)        Seizure syndrome (H)          Care Instructions      Times of Days  am pm noon       Medication Tablet Size Number of Tablets/Capsules Total Daily Dosage    Keppra  750  2  2        3000 mg    Dilantin  100  1 1        200 mg    Dilantin  30   2        60 mg    Zonegran  100   3        300 mg    Gabapentin  300 1  1 1      900 mg                                                           Carry this with you at all times.  CONTINUE TAKING YOUR OTHER MEDICATIONS AS PREVIOUSLY DIRECTED.      * * *Do not store medications in the bathroom.  Keep medications away from children!* * *             Follow-ups after your visit        Follow-up notes from your care team     Return in about 6 months (around 4/30/2018).      Your next 10 appointments already scheduled     Apr 30, 2018 10:00 AM CDT   Return Visit with MD [facility] ([facility])    [address], [address]  [facility] [address] [phone]              Who to contact     Please call your clinic at [phone] to:    Ask questions about your health    Make or cancel appointments    Discuss your medicines    Learn about your test results    Speak to your doctor   If you have compliments or concerns about an experience at your clinic, or if you wish to file a complaint, please contact [facility] Patient Relations at [phone] or email us at [email or web address]         Additional Information About Your Visit        [employer]xt Information     [employer] is an electronic gateway that provides easy, online access to your medical records. With [employer], you can request a  clinic appointment, read your test results, renew a prescription or communicate with your care team.     To sign up for [employer] visit the website at [email or web address]   You will be asked to enter the access code listed below, as well as some personal information. Please follow the directions to create your username and password.     Your access code is: [email or web address]  Expires: 2018 10:33 AM     Your access code will  in 90 days. If you need help or a new code, please contact your [facility] or call [phone] for assistance.        Care EveryWhere ID     This is your Care EveryWhere ID. This could be used by other organizations to access your [place] medical records  [id number]        Your Vitals Were     Pulse                   71            Blood Pressure from Last 3 Encounters:   10/30/17 103/61   17 108/70   17 117/66    Weight from Last 3 Encounters:   17 134 lb (60.8 kg)   17 139 lb 8 oz (63.3 kg)              Today, you had the following     No orders found for display         Today's Medication Changes          These changes are accurate as of: 10/30/17 10:33 AM.  If you have any questions, ask your nurse or doctor.               These medicines have changed or have updated prescriptions.        Dose/Directions    gabapentin 300 MG capsule   Commonly known as:  NEURONTIN   This may have changed:    - medication strength  - how much to take  - additional instructions   Used for:  Seizure syndrome (H)   Changed by:  Monroe Escobedo MD        Dose:  300 mg   Take 1 capsule (300 mg) by mouth 3 times daily Decrease to 600 mg tid for one month, then 300 mg tid.   Quantity:  90 capsule   Refills:  11       * phenytoin 30 MG CR capsule   Commonly known as:  DILANTIN   This may have changed:    - how much to take  - additional instructions   Used for:  Seizure syndrome (H)   Changed by:  Monroe Escobedo MD        Dose:  60 mg   Take 2 capsules  (60 mg) by mouth daily Two 30 mg tabs at bedtime with one 100 mg tab in the hs   Quantity:  60 capsule   Refills:  11       * phenytoin 100 MG CR capsule   Commonly known as:  DILANTIN   This may have changed:  Another medication with the same name was changed. Make sure you understand how and when to take each.   Used for:  Seizure syndrome (H)   Changed by:  Monroe Escobedo MD        Take 1 cap po BID ( together with 30 mg cap as directed)   Quantity:  60 capsule   Refills:  11       * Notice:  This list has 2 medication(s) that are the same as other medications prescribed for you. Read the directions carefully, and ask your doctor or other care provider to review them with you.         Where to get your medicines      These medications were sent to [facility] [place], [place] - [address]  [address] , [facility] [address]     Phone:  [phone]     gabapentin 300 MG capsule    levETIRAcetam 750 MG tablet    phenytoin 100 MG CR capsule    phenytoin 30 MG CR capsule    zonisamide 100 MG capsule                Primary Care Provider Office Phone # Fax #    Vanessa STEWART [facility] [phone]       [facility] [address]   [facility] [address]        Equal Access to Services     MARCY GONZALES : Hadii steven cisneros hadasho Soomaali, waaxda luqadaha, qaybta kaalmada adeegyada, brandt abarca haygerman linares. So [facility] [phone].    ATENCIÓN: Si habla español, tiene a pool disposición servicios gratuitos de asistencia lingüística. Llame al [phone].    We comply with applicable federal civil rights laws and [place] laws. We do not discriminate on the basis of race, color, national origin, age, disability, sex, sexual orientation, or gender identity.            Thank you!     Thank you for choosing [facility]  for your care. Our goal is always to provide you with excellent care. Hearing back from our patients is one way we can continue to improve our  services. Please take a few minutes to complete the written survey that you may receive in the mail after your visit with us. Thank you!             Your Updated Medication List - Protect others around you: Learn how to safely use, store and throw away your medicines at www.disposemymeds.org.          This list is accurate as of: 10/30/17 10:33 AM.  Always use your most recent med list.                   Brand Name Dispense Instructions for use Diagnosis    alendronate 70 MG tablet    FOSAMAX     Take 70 mg by mouth every 7 days        Calcium-Vitamin D 600-400 MG-UNIT Tabs           FERREX 150 PO           FOLIC ACID PO      Take 1 mg by mouth daily        gabapentin 300 MG capsule    NEURONTIN    90 capsule    Take 1 capsule (300 mg) by mouth 3 times daily Decrease to 600 mg tid for one month, then 300 mg tid.    Seizure syndrome (H)       gentian violet 1 % solution      Take 0.5 mLs by mouth as needed        levETIRAcetam 750 MG tablet    KEPPRA    120 tablet    Take 1 tablet (750 mg) by mouth 2 times daily    Seizure syndrome (H)       LEVOTHYROXINE SODIUM PO      Take 88 mcg by mouth daily        MILK OF MAGNESIA PO           PANTOPRAZOLE SODIUM PO      Take 40 mg by mouth daily        * phenytoin 30 MG CR capsule    DILANTIN    60 capsule    Take 2 capsules (60 mg) by mouth daily Two 30 mg tabs at bedtime with one 100 mg tab in the hs    Seizure syndrome (H)       * phenytoin 100 MG CR capsule    DILANTIN    60 capsule    Take 1 cap po BID ( together with 30 mg cap as directed)    Seizure syndrome (H)       PREPARATION H 0.25-14-74.9 % rectal ointment   Generic drug:  phenylephrine-shark liver oil-mineral oil-petrolatum      Place rectally 2 times daily as needed for hemorrhoids        study - bisacodyl 10 MG Suppository    IDS# [id number]     Place 10 mg rectally daily as needed for constipation        VITAMIN C PO      Take 500 mg by mouth daily        zonisamide 100 MG capsule    ZONEGRAN    90 capsule    300 mg  at bedtime.    Seizure disorder (H)       * Notice:  This list has 2 medication(s) that are the same as other medications prescribed for you. Read the directions carefully, and ask your doctor or other care provider to review them with you.

## 2017-10-30 NOTE — PATIENT INSTRUCTIONS
Times of Days  am pm noon       Medication Tablet Size Number of Tablets/Capsules Total Daily Dosage    Keppra  750  2  2        3000 mg    Dilantin  100  1 1        200 mg    Dilantin  30   2        60 mg    Zonegran  100   3        300 mg    Gabapentin  300 1  1 1      900 mg                                                           Carry this with you at all times.  CONTINUE TAKING YOUR OTHER MEDICATIONS AS PREVIOUSLY DIRECTED.      * * *Do not store medications in the bathroom.  Keep medications away from children!* * *

## 2017-10-30 NOTE — PROGRESS NOTES
"CC: Seizures    HPI: Mr. Charles Lugo returns for follow up.  He is a 62-year-old male with a history of severe developmental delay and a longstanding intractable seizures.  He was previously seen by Dr. Kofi Xie at [place] and Dr. Bliss in [facility] and he was being referred for further evaluation and management to optimize his epilepsy care.  The patient is accompanied by his sister and 2 of the caregivers from the group home.  Patient was added with Zonegran since April 2017/ He is now on Zonegran 300mg qhs.  He is also on Dilantin 100-160, Keppra 1500 mg b.i.d. and Gabapentin 900 mg t.i.d. Since we started on Zonegran, his seizures improved. Since the last visit, he had only 2 seizures, all his typical CPs that lasted for 30 seconds each.  He was having 17 seizure/3 months before Zonegran, now he is having 2 seizures/3 months.  Caregiver reported patient continue to have a strong \"urrine smell\" since starte don Zonegran.  No other side effects were reported.     The patient has a history of severe developmental delay.  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  From the limited information we obtained so far he has never had good seizure control in the past.  He has a long history of intractable seizures since the age of 4 years old.  He is currently taking Dilantin, Keppra and gabapentin for his seizure control.  He is on Dilantin 100-160, Keppra 1500 mg b.i.d. and gabapentin 900 mg t.i.d.  His caregiver and sisters do not know what other anti-seizure medications he had in the past.      According to the caregiver, the patient only has 1 type of seizure.  During a seizure, his head will drop suddenly and then he may make some noises.  Then his arms will go up.  He will make fists with hands.  His eyes will roll back and sometimes he will have some leg kicking.  Then he will have convulsions with upper and lower " extremities and his body.  Usually it lasts for 10 seconds to 45 seconds in duration.  At present time it happens once to twice a week.  The caregiver felt that his seizure frequency has never changed significantly for the past 5 years since she was his main caregiver.      Caregiver did not notice any significant side effects from the medications because the patient is nonverbal.  The patient did not have any MRI scan of his brain in the past and it is unclear when he had his last EEG done.      TRIGGERS FOR SEIZURES:  Unclear.      RISK FACTORS FOR SEIZURES:  He has a history of trauma with severe developmental delay after birth.  No history of stroke.  No history of brain tumor.  No history of CNS infection.  No history of febrile convulsions.      Current Outpatient Prescriptions   Medication Sig Dispense Refill     zonisamide (ZONEGRAN) 100 MG capsule 300 mg at bedtime. 90 capsule 0     phenytoin (DILANTIN) 100 MG CR capsule Take 1 cap po BID ( together with 30 mg cap as directed) 60 capsule 3     study - bisacodyl (IDS# 4779) 10 MG Suppository Place 10 mg rectally daily as needed for constipation       Magnesium Hydroxide (MILK OF MAGNESIA PO)        alendronate (FOSAMAX) 70 MG tablet Take 70 mg by mouth every 7 days       phenylephrine-shark liver oil-mineral oil-petrolatum (PREPARATION H) 0.25-14-74.9 % rectal ointment Place rectally 2 times daily as needed for hemorrhoids       Calcium Carb-Cholecalciferol (CALCIUM-VITAMIN D) 600-400 MG-UNIT TABS        phenytoin (DILANTIN) 30 MG CR capsule Take 160 mg by mouth daily Two 30 mg tabs in the am and two 30 mg tabs along with one 100 mg tab in the hs       Polysaccharide Iron Complex (FERREX 150 PO)        FOLIC ACID PO Take 1 mg by mouth daily       GABAPENTIN PO Take 600 mg by mouth 3 times daily 1.5 tablets three times a day       levETIRAcetam (KEPPRA) 750 MG tablet Take 750 mg by mouth 2 times daily       LEVOTHYROXINE SODIUM PO Take 88 mcg by mouth daily    "    PANTOPRAZOLE SODIUM PO Take 40 mg by mouth daily       Ascorbic Acid (VITAMIN C PO) Take 500 mg by mouth daily       gentian violet 1 % solution Take 0.5 mLs by mouth as needed       PAST AEDs:  Unclear.    PAST MEDICAL HISTORY:   1.  Intractable epilepsy.   2.  Severe developmental delay.      PAST SURGICAL HISTORY:  Teeth extraction.      ALLERGIES:  Debrox.      FAMILY HISTORY:  Positive for bone cancer in father.  Brother has history of hypertension.      SOCIAL HISTORY:  He lives in a group home.  No education in the past.  He is nonverbal and has no interaction with surroundings.  No smoking, no drugs, no alcohol.  He has 2 sisters for his main social support.      REVIEW OF SYSTEMS:  Positive for \"strong urine smell\".  The rest of the 8-point review of systems is negative as per the caregiver.      PREVIOUS DIAGNOSTIC TESTING:     EEG (4/3/2017)   IMPRESSION:  This is a markedly abnormal EEG due to the presence of moderate to severe generalized slowing of the background activities.  There were also intermittent generalized and left temporal focal epileptiform activities during this recording.  These findings are diagnostic of an epileptic disorder.  However, it is unclear at this time whether the patient has generalized epilepsy versus focal epilepsy.     Component      Latest Ref Rng & Units 7/17/2017   Phenytoin Level      10 - 20 mg/L 17.8   Phenytoin Free       1.26   Zonisamide Level Quant       12   Keppra (Levetiracetam) Level       56 (H)       PHYSICAL EXAMINATION:   Blood pressure 103/61, pulse 71.    GENERAL:  Normocephalic, atraumatic.   NECK:  Supple.   EXTREMITIES:  No edema.     NEUROLOGIC:  The patient is alert, nonverbal, no interaction, no eye contact.  No eye tracking. Pupils are equal, round and reactive to light.  Fundi exam unable to observe.  No facial weakness.   MOTOR EXAM:  Moving all extremities spontaneously.  Strength cannot be tested.  Deep tendon reflexes 2+ in both " "patellar, biceps and brachioradialis.   COORDINATION:  Sensory exam not able to perform.   GAIT:  Not tested.  The patient is in a wheelchair.      IMPRESSION:     1.  Intractable epilepsy. He is a 62-year-old male with a history of severe developmental delay and a longstanding intractable seizures.  He was previously seen by Dr. Kofi Xie at [place] and Dr. Bliss in [facility] and he was being referred for further evaluation and management to optimize his epilepsy care.  The patient is accompanied by his sister and 2 of the caregivers from the group home.  Patient was added with Zonegran since April 2017/ He is now on Zonegran 300mg qhs.  He is also on Dilantin 100-160, Keppra 1500 mg b.i.d. and Gabapentin 900 mg t.i.d. Since we started on Zonegran, his seizures improved. Since the last visit, he had only 2 seizures, all his typical CPs that lasted for 30 seconds each.  He was having 17 seizure/3 months before Zonegran, now he is having 2 seizures/3 months.  Caregiver reported patient continue to have a strong \"urrine smell\" since starte don Zonegran.  No other side effects were reported.     2.  Severe developmental delay, stable.      PLAN:   1.  Continue Dilantin 100-160, Keppra 1500 mg b.i.d.  Zonegran 300 mg qhs.  2.  Decrease Gabapentin to 600 mg tid for 1 month, then 300 mg tid.  2.  If patient continue to have frequent seizures, options in the future include increasing Dilantin or Zonegran. Sister would like to get him off some AEDs if possible.  We may consider stop Gabapentin during next visit.  3.  Return to clinic in 6 months.         30 min was spent on the visit.  Over 50% of the time was spent on education, counseling about optimal seizure control and coordination of care.    "

## 2017-10-30 NOTE — LETTER
"10/30/2017       RE: Charles Lugo  : 1955   MRN: [id number]      Dear Colleague,    Thank you for referring your patient, Charles Lugo, to the [facility]PI[facility] at [facility]. Please see a copy of my visit note below.    CC: Seizures    HPI: Mr. Charles Lugo returns for follow up.  He is a 62-year-old male with a history of severe developmental delay and a longstanding intractable seizures.  He was previously seen by Dr. Kofi Xie at [place] and Dr. Bliss in [facility] and he was being referred for further evaluation and management to optimize his epilepsy care.  The patient is accompanied by his sister and 2 of the caregivers from the group home.  Patient was added with Zonegran since 2017/ He is now on Zonegran 300mg qhs.  He is also on Dilantin 100-160, Keppra 1500 mg b.i.d. and Gabapentin 900 mg t.i.d. Since we started on Zonegran, his seizures improved. Since the last visit, he had only 2 seizures, all his typical CPs that lasted for 30 seconds each.  He was having 17 seizure/3 months before Zonegran, now he is having 2 seizures/3 months.  Caregiver reported patient continue to have a strong \"urrine smell\" since starte don Zonegran.  No other side effects were reported.     The patient has a history of severe developmental delay.  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  From the limited information we obtained so far he has never had good seizure control in the past.  He has a long history of intractable seizures since the age of 4 years old.  He is currently taking Dilantin, Keppra and gabapentin for his seizure control.  He is on Dilantin 100-160, Keppra 1500 mg b.i.d. and gabapentin 900 mg t.i.d.  His caregiver and sisters do not know what other anti-seizure medications he had in the past.      According to the caregiver, the patient only has 1 type " of seizure.  During a seizure, his head will drop suddenly and then he may make some noises.  Then his arms will go up.  He will make fists with hands.  His eyes will roll back and sometimes he will have some leg kicking.  Then he will have convulsions with upper and lower extremities and his body.  Usually it lasts for 10 seconds to 45 seconds in duration.  At present time it happens once to twice a week.  The caregiver felt that his seizure frequency has never changed significantly for the past 5 years since she was his main caregiver.      Caregiver did not notice any significant side effects from the medications because the patient is nonverbal.  The patient did not have any MRI scan of his brain in the past and it is unclear when he had his last EEG done.      TRIGGERS FOR SEIZURES:  Unclear.      RISK FACTORS FOR SEIZURES:  He has a history of trauma with severe developmental delay after birth.  No history of stroke.  No history of brain tumor.  No history of CNS infection.  No history of febrile convulsions.      Current Outpatient Prescriptions   Medication Sig Dispense Refill     zonisamide (ZONEGRAN) 100 MG capsule 300 mg at bedtime. 90 capsule 0     phenytoin (DILANTIN) 100 MG CR capsule Take 1 cap po BID ( together with 30 mg cap as directed) 60 capsule 3     study - bisacodyl (IDS# 4779) 10 MG Suppository Place 10 mg rectally daily as needed for constipation       Magnesium Hydroxide (MILK OF MAGNESIA PO)        alendronate (FOSAMAX) 70 MG tablet Take 70 mg by mouth every 7 days       phenylephrine-shark liver oil-mineral oil-petrolatum (PREPARATION H) 0.25-14-74.9 % rectal ointment Place rectally 2 times daily as needed for hemorrhoids       Calcium Carb-Cholecalciferol (CALCIUM-VITAMIN D) 600-400 MG-UNIT TABS        phenytoin (DILANTIN) 30 MG CR capsule Take 160 mg by mouth daily Two 30 mg tabs in the am and two 30 mg tabs along with one 100 mg tab in the hs       Polysaccharide Iron Complex (FERREX  "150 PO)        FOLIC ACID PO Take 1 mg by mouth daily       GABAPENTIN PO Take 600 mg by mouth 3 times daily 1.5 tablets three times a day       levETIRAcetam (KEPPRA) 750 MG tablet Take 750 mg by mouth 2 times daily       LEVOTHYROXINE SODIUM PO Take 88 mcg by mouth daily       PANTOPRAZOLE SODIUM PO Take 40 mg by mouth daily       Ascorbic Acid (VITAMIN C PO) Take 500 mg by mouth daily       gentian violet 1 % solution Take 0.5 mLs by mouth as needed       PAST AEDs:  Unclear.    PAST MEDICAL HISTORY:   1.  Intractable epilepsy.   2.  Severe developmental delay.      PAST SURGICAL HISTORY:  Teeth extraction.      ALLERGIES:  Debrox.      FAMILY HISTORY:  Positive for bone cancer in father.  Brother has history of hypertension.      SOCIAL HISTORY:  He lives in a group home.  No education in the past.  He is nonverbal and has no interaction with surroundings.  No smoking, no drugs, no alcohol.  He has 2 sisters for his main social support.      REVIEW OF SYSTEMS:  Positive for \"strong urine smell\".  The rest of the 8-point review of systems is negative as per the caregiver.      PREVIOUS DIAGNOSTIC TESTING:     EEG (4/3/2017)   IMPRESSION:  This is a markedly abnormal EEG due to the presence of moderate to severe generalized slowing of the background activities.  There were also intermittent generalized and left temporal focal epileptiform activities during this recording.  These findings are diagnostic of an epileptic disorder.  However, it is unclear at this time whether the patient has generalized epilepsy versus focal epilepsy.     Component      Latest Ref Rng & Units 7/17/2017   Phenytoin Level      10 - 20 mg/L 17.8   Phenytoin Free       1.26   Zonisamide Level Quant       12   Keppra (Levetiracetam) Level       56 (H)       PHYSICAL EXAMINATION:   Blood pressure 103/61, pulse 71.    GENERAL:  Normocephalic, atraumatic.   NECK:  Supple.   EXTREMITIES:  No edema.     NEUROLOGIC:  The patient is alert, " "nonverbal, no interaction, no eye contact.  No eye tracking. Pupils are equal, round and reactive to light.  Fundi exam unable to observe.  No facial weakness.   MOTOR EXAM:  Moving all extremities spontaneously.  Strength cannot be tested.  Deep tendon reflexes 2+ in both patellar, biceps and brachioradialis.   COORDINATION:  Sensory exam not able to perform.   GAIT:  Not tested.  The patient is in a wheelchair.      IMPRESSION:     1.  Intractable epilepsy. He is a 62-year-old male with a history of severe developmental delay and a longstanding intractable seizures.  He was previously seen by Dr. Kofi Xie at [place] and Dr. Bliss in [facility] and he was being referred for further evaluation and management to optimize his epilepsy care.  The patient is accompanied by his sister and 2 of the caregivers from the group home.  Patient was added with Zonegran since April 2017/ He is now on Zonegran 300mg qhs.  He is also on Dilantin 100-160, Keppra 1500 mg b.i.d. and Gabapentin 900 mg t.i.d. Since we started on Zonegran, his seizures improved. Since the last visit, he had only 2 seizures, all his typical CPs that lasted for 30 seconds each.  He was having 17 seizure/3 months before Zonegran, now he is having 2 seizures/3 months.  Caregiver reported patient continue to have a strong \"urrine smell\" since starte don Zonegran.  No other side effects were reported.     2.  Severe developmental delay, stable.      PLAN:   1.  Continue Dilantin 100-160, Keppra 1500 mg b.i.d.  Zonegran 300 mg qhs.  2.  Decrease Gabapentin to 600 mg tid for 1 month, then 300 mg tid.  2.  If patient continue to have frequent seizures, options in the future include increasing Dilantin or Zonegran. Sister would like to get him off some AEDs if possible.  We may consider stop Gabapentin during next visit.  3.  Return to clinic in 6 months.         30 min was spent on the visit.  Over 50% of the time was spent on education, counseling about " optimal seizure control and coordination of care.    Sincerely,    Monroe Escobedo MD

## 2017-11-29 ENCOUNTER — TELEPHONE (OUTPATIENT)
Dept: NEUROLOGY | Facility: CLINIC | Age: 62
End: 2017-11-29

## 2017-11-29 DIAGNOSIS — G40.909 SEIZURE SYNDROME (H): ICD-10-CM

## 2017-11-29 RX ORDER — LEVETIRACETAM 750 MG/1
1500 TABLET ORAL 2 TIMES DAILY
Qty: 120 TABLET | Refills: 11 | Status: SHIPPED | OUTPATIENT
Start: 2017-11-29 | End: 2018-05-14

## 2017-11-29 NOTE — TELEPHONE ENCOUNTER
----- Message from Bakari Michaels LPN sent at 11/28/2017 11:41 AM CST -----  Regarding: refill  [facility] pharmacy called stating the levETIRAcetam (KEPPRA) 750 MG tablet was sent as 1 tab twice daily but should be 2 tabs twice daily. They would like a new rx if this is correct.  Thank you  Bakari Michaels LPN

## 2018-02-28 ENCOUNTER — TELEPHONE (OUTPATIENT)
Dept: NEUROLOGY | Facility: CLINIC | Age: 63
End: 2018-02-28

## 2018-02-28 NOTE — TELEPHONE ENCOUNTER
Group home clarification form received, via fax on 02/28/18. Form placed in nurses office to review.

## 2018-02-28 NOTE — TELEPHONE ENCOUNTER
Situation: Writer spoke with Doris by telephone in regards to a fax she sent to the clinic describing an increase in seizure activity over the last two weeks.     Background:    Description of seizure: his normal seizures, 10-30 seconds long, whole body stiffening    Frequency of seizures: two weeks ago he had a cluster of 1 per day for 3 days, since October seizures 6 seizures. He may have had one additional.     Missed medications / new medications: none    Recent changes in medication: GBP 2700 daily, then decreased to 1800 on Octo 31st 2017, and how to 900 daily on November 28th 2017.     Sleep deprivation, illness, n/v/d: none    Other contributing factors: no changes to his health    Using a pill box: patient is dosed by medical staff    Current/confirmed anticonvulsant medications: will have group home staff fax to [facility]    A: discuss with Carlos GONSALEZ after MAR is received.   P: not yet determined.

## 2018-05-14 ENCOUNTER — OFFICE VISIT (OUTPATIENT)
Dept: NEUROLOGY | Facility: CLINIC | Age: 63
End: 2018-05-14
Payer: MEDICARE

## 2018-05-14 VITALS
SYSTOLIC BLOOD PRESSURE: 76 MMHG | DIASTOLIC BLOOD PRESSURE: 53 MMHG | TEMPERATURE: 98.3 F | HEART RATE: 73 BPM | RESPIRATION RATE: 16 BRPM

## 2018-05-14 DIAGNOSIS — G40.909 SEIZURE DISORDER (H): ICD-10-CM

## 2018-05-14 DIAGNOSIS — G40.909 SEIZURE SYNDROME (H): ICD-10-CM

## 2018-05-14 RX ORDER — PHENYTOIN SODIUM 100 MG/1
CAPSULE, EXTENDED RELEASE ORAL
Qty: 60 CAPSULE | Refills: 11 | Status: SHIPPED | OUTPATIENT
Start: 2018-05-14 | End: 2019-04-22

## 2018-05-14 RX ORDER — LEVETIRACETAM 750 MG/1
1500 TABLET ORAL 2 TIMES DAILY
Qty: 120 TABLET | Refills: 11 | Status: SHIPPED | OUTPATIENT
Start: 2018-05-14 | End: 2019-04-22

## 2018-05-14 RX ORDER — ZONISAMIDE 100 MG/1
CAPSULE ORAL
Qty: 90 CAPSULE | Refills: 11 | Status: SHIPPED | OUTPATIENT
Start: 2018-05-14 | End: 2019-04-22

## 2018-05-14 ASSESSMENT — PAIN SCALES - GENERAL: PAINLEVEL: NO PAIN (0)

## 2018-05-14 NOTE — MR AVS SNAPSHOT
After Visit Summary   2018    Charles Lugo    MRN: [id number]           Patient Information     Date Of Birth          1955        Visit Information        Provider Department      2018 3:00 PM Monore Escobedo MD [facility]        Today's Diagnoses     Seizure disorder (H)        Seizure syndrome (H)           Follow-ups after your visit        Your next 10 appointments already scheduled     Nov 15, 2018 11:00 AM CST   Return Visit with [facility]    [address], [address]  [facility] [id number] [phone]              Who to contact     Please call your clinic at [phone] to:    Ask questions about your health    Make or cancel appointments    Discuss your medicines    Learn about your test results    Speak to your doctor            Additional Information About Your Visit        MyChart Information     [employer] is an electronic gateway that provides easy, online access to your medical records. With [employer], you can request a clinic appointment, read your test results, renew a prescription or communicate with your care team.     To sign up for [employer] visit the website at [email or web address]   You will be asked to enter the access code listed below, as well as some personal information. Please follow the directions to create your username and password.     Your access code is: [email or web address]  Expires: 2018  3:23 PM     Your access code will  in 90 days. If you need help or a new code, please contact your [facility] or call [phone] for assistance.        Care EveryWhere ID     This is your Care EveryWhere ID. This could be used by other organizations to access your [place] medical records  [id number]        Your Vitals Were     Pulse Temperature Respirations             73 98.3  F (36.8  C) 16          Blood Pressure from Last 3 Encounters:   18  (!) 76/53   10/30/17 103/61   07/17/17 108/70    Weight from Last 3 Encounters:   07/17/17 134 lb (60.8 kg)   04/03/17 139 lb 8 oz (63.3 kg)              Today, you had the following     No orders found for display         Today's Medication Changes          These changes are accurate as of 5/14/18 11:59 PM.  If you have any questions, ask your nurse or doctor.               Stop taking these medicines if you haven't already. Please contact your care team if you have questions.     gabapentin 300 MG capsule   Commonly known as:  NEURONTIN   Stopped by:  Monroe Escobedo MD                Where to get your medicines      These medications were sent to [facility] [address]  [address] , [facility] [address]     Phone:  [phone]     levETIRAcetam 750 MG tablet    phenytoin 100 MG CR capsule    phenytoin 30 MG CR capsule    zonisamide 100 MG capsule                Primary Care Provider Office Phone # Fax #    Vanessa STEWART [facility] [phone]       [facility] [address]   [facility] [address]        Equal Access to Services     [facility] AH: Hadii aad ku hadasho Soomaali, waaxda luqadaha, qaybta kaalmada adeegyada, waxay idiin hayaan adejudie de la rosa . So [facility] [phone].    ATENCIÓN: Si habla español, tiene a pool disposición servicios gratuitos de asistencia lingüística. Sheree al [phone].    We comply with applicable federal civil rights laws and [place] laws. We do not discriminate on the basis of race, color, national origin, age, disability, sex, sexual orientation, or gender identity.            Thank you!     Thank you for choosing [facility]  for your care. Our goal is always to provide you with excellent care. Hearing back from our patients is one way we can continue to improve our services. Please take a few minutes to complete the written survey that you may receive in the mail after your visit with us. Thank you!              Your Updated Medication List - Protect others around you: Learn how to safely use, store and throw away your medicines at www.disposemymeds.org.          This list is accurate as of 5/14/18 11:59 PM.  Always use your most recent med list.                   Brand Name Dispense Instructions for use Diagnosis    alendronate 70 MG tablet    FOSAMAX     Take 70 mg by mouth every 7 days        Calcium-Vitamin D 600-400 MG-UNIT Tabs           FERREX 150 PO           FOLIC ACID PO      Take 1 mg by mouth daily        gentian violet 1 % solution      Take 0.5 mLs by mouth as needed        levETIRAcetam 750 MG tablet    KEPPRA    120 tablet    Take 2 tablets (1,500 mg) by mouth 2 times daily    Seizure syndrome (H)       LEVOTHYROXINE SODIUM PO      Take 88 mcg by mouth daily        MILK OF MAGNESIA PO           PANTOPRAZOLE SODIUM PO      Take 40 mg by mouth daily        * phenytoin 30 MG CR capsule    DILANTIN    60 capsule    Take 2 capsules (60 mg) by mouth daily Two 30 mg tabs at bedtime with one 100 mg tab in the hs    Seizure syndrome (H)       * phenytoin 100 MG CR capsule    DILANTIN    60 capsule    Take 1 cap po BID ( together with 30 mg cap as directed)    Seizure syndrome (H)       PREPARATION H 0.25-14-74.9 % rectal ointment   Generic drug:  phenylephrine-shark liver oil-mineral oil-petrolatum      Place rectally 2 times daily as needed for hemorrhoids        study - bisacodyl 10 MG Suppository    IDS# 4779     Place 10 mg rectally daily as needed for constipation        VITAMIN C PO      Take 500 mg by mouth daily        zonisamide 100 MG capsule    ZONEGRAN    90 capsule    300 mg at bedtime.    Seizure disorder (H)       * Notice:  This list has 2 medication(s) that are the same as other medications prescribed for you. Read the directions carefully, and ask your doctor or other care provider to review them with you.

## 2018-05-14 NOTE — LETTER
2018       RE: Charles Lugo  : 1955   MRN: [id number]      Dear Colleague,    Thank you for referring your patient, Charles Lugo, to the [facility] at [facility]. Please see a copy of my visit note below.    CC: Seizures    HPI: Mr. Charles Lugo returns for follow up.  He is a 62-year-old male with a history of severe developmental delay and a longstanding intractable seizures.  He was previously seen by Dr. Kofi Xie at [place] and Dr. Bliss in [facility] and he was being referred for further evaluation and management to optimize his epilepsy care.  The patient is accompanied by his sister and 2 of the caregivers from the group home.  Patient was added with Zonegran since 2017. He is now on Zonegran 300mg qhs, Dilantin 100-160, Keppra 1500 mg b.i.d. Gabapentin was tapered down to 300 mg t.i.d. Since we started on Zonegran, his seizures improved. Since the last visit about 7 months ago, he had about 11 seizures, all his typical CPs that lasted for 30 seconds each.  He was having 6-7 seizures per months before Zonegran was started.     The patient has a history of severe developmental delay.  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  From the limited information we obtained so far he has never had good seizure control in the past.  He has a long history of intractable seizures since the age of 4 years old.  He is currently taking Dilantin, Keppra and gabapentin for his seizure control.  He is on Dilantin 100-160, Keppra 1500 mg b.i.d. and gabapentin 900 mg t.i.d.  His caregiver and sisters do not know what other anti-seizure medications he had in the past.      According to the caregiver, the patient only has 1 type of seizure.  During a seizure, his head will drop suddenly and then he may make some noises.  Then his arms will go up.  He will make  fists with hands.  His eyes will roll back and sometimes he will have some leg kicking.  Then he will have convulsions with upper and lower extremities and his body.  Usually it lasts for 10 seconds to 45 seconds in duration.  At present time it happens once to twice a week.  The caregiver felt that his seizure frequency has never changed significantly for the past 5 years since she was his main caregiver.      Caregiver did not notice any significant side effects from the medications because the patient is nonverbal.  The patient did not have any MRI scan of his brain in the past and it is unclear when he had his last EEG done.      TRIGGERS FOR SEIZURES:  Unclear.      RISK FACTORS FOR SEIZURES:  He has a history of trauma with severe developmental delay after birth.  No history of stroke.  No history of brain tumor.  No history of CNS infection.  No history of febrile convulsions.      Current Outpatient Prescriptions   Medication Sig Dispense Refill     alendronate (FOSAMAX) 70 MG tablet Take 70 mg by mouth every 7 days       Ascorbic Acid (VITAMIN C PO) Take 500 mg by mouth daily       Calcium Carb-Cholecalciferol (CALCIUM-VITAMIN D) 600-400 MG-UNIT TABS        FOLIC ACID PO Take 1 mg by mouth daily       gabapentin (NEURONTIN) 300 MG capsule Take 1 capsule (300 mg) by mouth 3 times daily Decrease to 600 mg tid for one month, then 300 mg tid. 90 capsule 11     gentian violet 1 % solution Take 0.5 mLs by mouth as needed       levETIRAcetam (KEPPRA) 750 MG tablet Take 2 tablets (1,500 mg) by mouth 2 times daily 120 tablet 11     LEVOTHYROXINE SODIUM PO Take 88 mcg by mouth daily       Magnesium Hydroxide (MILK OF MAGNESIA PO)        PANTOPRAZOLE SODIUM PO Take 40 mg by mouth daily       phenylephrine-shark liver oil-mineral oil-petrolatum (PREPARATION H) 0.25-14-74.9 % rectal ointment Place rectally 2 times daily as needed for hemorrhoids       phenytoin (DILANTIN) 100 MG CR capsule Take 1 cap po BID ( together  "with 30 mg cap as directed) 60 capsule 11     phenytoin (DILANTIN) 30 MG CR capsule Take 2 capsules (60 mg) by mouth daily Two 30 mg tabs at bedtime with one 100 mg tab in the hs 60 capsule 11     Polysaccharide Iron Complex (FERREX 150 PO)        study - bisacodyl (IDS# 4779) 10 MG Suppository Place 10 mg rectally daily as needed for constipation       zonisamide (ZONEGRAN) 100 MG capsule 300 mg at bedtime. 90 capsule 11     PAST AEDs:  Unclear.    PAST MEDICAL HISTORY:   1.  Intractable epilepsy.   2.  Severe developmental delay.      PAST SURGICAL HISTORY:  Teeth extraction.      ALLERGIES:  Debrox.      FAMILY HISTORY:  Positive for bone cancer in father.  Brother has history of hypertension.      SOCIAL HISTORY:  He lives in a group home.  No education in the past.  He is nonverbal and has no interaction with surroundings.  No smoking, no drugs, no alcohol.  He has 2 sisters for his main social support.      REVIEW OF SYSTEMS:  Positive for \"strong urine smell\".  The rest of the 8-point review of systems is negative as per the caregiver.      PREVIOUS DIAGNOSTIC TESTING:     EEG (4/3/2017)   IMPRESSION:  This is a markedly abnormal EEG due to the presence of moderate to severe generalized slowing of the background activities.  There were also intermittent generalized and left temporal focal epileptiform activities during this recording.  These findings are diagnostic of an epileptic disorder.  However, it is unclear at this time whether the patient has generalized epilepsy versus focal epilepsy.     Component      Latest Ref Rng & Units 7/17/2017   Phenytoin Level      10 - 20 mg/L 17.8   Phenytoin Free       1.26   Zonisamide Level Quant       12   Keppra (Levetiracetam) Level       56 (H)       PHYSICAL EXAMINATION:   Blood pressure (!) 76/53, pulse 73, temperature 98.3  F (36.8  C), resp. rate 16.    GENERAL:  Normocephalic, atraumatic.   NECK:  Supple.   EXTREMITIES:  No edema.     NEUROLOGIC:  The patient " is alert, nonverbal, no interaction, no eye contact.  No eye tracking. Pupils are equal, round and reactive to light.  Fundi exam unable to observe.  No facial weakness.   MOTOR EXAM:  Moving all extremities spontaneously.  Strength cannot be tested.  Deep tendon reflexes 2+ in both patellar, biceps and brachioradialis.   COORDINATION:  Sensory exam not able to perform.   GAIT:  Not tested.  The patient is in a wheelchair.      IMPRESSION:     1.  Intractable epilepsy. Mr. Charles Lugo returns for follow up.  He is a 62-year-old male with a history of severe developmental delay and a longstanding intractable seizures.  He was previously seen by Dr. Kofi Xie at [place] and Dr. Bliss in [facility] and he was being referred for further evaluation and management to optimize his epilepsy care.  The patient is accompanied by his sister and 2 of the caregivers from the group home.  Patient was added with Zonegran since April 2017. He is now on Zonegran 300mg qhs, Dilantin 100-160, Keppra 1500 mg b.i.d. Gabapentin was tapered down to 300 mg t.i.d. Since we started on Zonegran, his seizures improved. Since the last visit about 7 months ago, he had about 11 seizures, all his typical CPs that lasted for 30 seconds each.  He was having 6-7 seizures per months before Zonegran was started.     2.  Severe developmental delay, stable.      PLAN:   1.  Continue Dilantin 100-160, Keppra 1500 mg b.i.d.  Zonegran 300 mg qhs.  2.  D/C Gabapentin.  3.  If patient has increased seizures, options in the future include increasing Dilantin or Zonegran.   4.  Return to clinic in 6 months.  Sister would like to cut daon more on his meds. May consider to taper Keppra in the near future.        25 min was spent on the visit.  Over 50% of the time was spent on education, counseling about optimal seizure control and coordination of care.      Again, thank you for allowing me to participate in the care of your patient.       Sincerely,    Monroe Escobedo MD

## 2018-05-14 NOTE — PROGRESS NOTES
CC: Seizures    HPI: Mr. Charles Lugo returns for follow up.  He is a 62-year-old male with a history of severe developmental delay and a longstanding intractable seizures.  He was previously seen by Dr. Kofi Xie at [place] and Dr. Bliss in [facility] and he was being referred for further evaluation and management to optimize his epilepsy care.  The patient is accompanied by his sister and 2 of the caregivers from the group home.  Patient was added with Zonegran since April 2017. He is now on Zonegran 300mg qhs, Dilantin 100-160, Keppra 1500 mg b.i.d. Gabapentin was tapered down to 300 mg t.i.d. Since we started on Zonegran, his seizures improved. Since the last visit about 7 months ago, he had about 11 seizures, all his typical CPs that lasted for 30 seconds each.  He was having 6-7 seizures per months before Zonegran was started.     The patient has a history of severe developmental delay.  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  From the limited information we obtained so far he has never had good seizure control in the past.  He has a long history of intractable seizures since the age of 4 years old.  He is currently taking Dilantin, Keppra and gabapentin for his seizure control.  He is on Dilantin 100-160, Keppra 1500 mg b.i.d. and gabapentin 900 mg t.i.d.  His caregiver and sisters do not know what other anti-seizure medications he had in the past.      According to the caregiver, the patient only has 1 type of seizure.  During a seizure, his head will drop suddenly and then he may make some noises.  Then his arms will go up.  He will make fists with hands.  His eyes will roll back and sometimes he will have some leg kicking.  Then he will have convulsions with upper and lower extremities and his body.  Usually it lasts for 10 seconds to 45 seconds in duration.  At present time it happens once to twice a week.   The caregiver felt that his seizure frequency has never changed significantly for the past 5 years since she was his main caregiver.      Caregiver did not notice any significant side effects from the medications because the patient is nonverbal.  The patient did not have any MRI scan of his brain in the past and it is unclear when he had his last EEG done.      TRIGGERS FOR SEIZURES:  Unclear.      RISK FACTORS FOR SEIZURES:  He has a history of trauma with severe developmental delay after birth.  No history of stroke.  No history of brain tumor.  No history of CNS infection.  No history of febrile convulsions.      Current Outpatient Prescriptions   Medication Sig Dispense Refill     alendronate (FOSAMAX) 70 MG tablet Take 70 mg by mouth every 7 days       Ascorbic Acid (VITAMIN C PO) Take 500 mg by mouth daily       Calcium Carb-Cholecalciferol (CALCIUM-VITAMIN D) 600-400 MG-UNIT TABS        FOLIC ACID PO Take 1 mg by mouth daily       gabapentin (NEURONTIN) 300 MG capsule Take 1 capsule (300 mg) by mouth 3 times daily Decrease to 600 mg tid for one month, then 300 mg tid. 90 capsule 11     gentian violet 1 % solution Take 0.5 mLs by mouth as needed       levETIRAcetam (KEPPRA) 750 MG tablet Take 2 tablets (1,500 mg) by mouth 2 times daily 120 tablet 11     LEVOTHYROXINE SODIUM PO Take 88 mcg by mouth daily       Magnesium Hydroxide (MILK OF MAGNESIA PO)        PANTOPRAZOLE SODIUM PO Take 40 mg by mouth daily       phenylephrine-shark liver oil-mineral oil-petrolatum (PREPARATION H) 0.25-14-74.9 % rectal ointment Place rectally 2 times daily as needed for hemorrhoids       phenytoin (DILANTIN) 100 MG CR capsule Take 1 cap po BID ( together with 30 mg cap as directed) 60 capsule 11     phenytoin (DILANTIN) 30 MG CR capsule Take 2 capsules (60 mg) by mouth daily Two 30 mg tabs at bedtime with one 100 mg tab in the hs 60 capsule 11     Polysaccharide Iron Complex (FERREX 150 PO)        study - bisacodyl (IDS# 4779)  "10 MG Suppository Place 10 mg rectally daily as needed for constipation       zonisamide (ZONEGRAN) 100 MG capsule 300 mg at bedtime. 90 capsule 11     PAST AEDs:  Unclear.    PAST MEDICAL HISTORY:   1.  Intractable epilepsy.   2.  Severe developmental delay.      PAST SURGICAL HISTORY:  Teeth extraction.      ALLERGIES:  Debrox.      FAMILY HISTORY:  Positive for bone cancer in father.  Brother has history of hypertension.      SOCIAL HISTORY:  He lives in a group home.  No education in the past.  He is nonverbal and has no interaction with surroundings.  No smoking, no drugs, no alcohol.  He has 2 sisters for his main social support.      REVIEW OF SYSTEMS:  Positive for \"strong urine smell\".  The rest of the 8-point review of systems is negative as per the caregiver.      PREVIOUS DIAGNOSTIC TESTING:     EEG (4/3/2017)   IMPRESSION:  This is a markedly abnormal EEG due to the presence of moderate to severe generalized slowing of the background activities.  There were also intermittent generalized and left temporal focal epileptiform activities during this recording.  These findings are diagnostic of an epileptic disorder.  However, it is unclear at this time whether the patient has generalized epilepsy versus focal epilepsy.     Component      Latest Ref Rng & Units 7/17/2017   Phenytoin Level      10 - 20 mg/L 17.8   Phenytoin Free       1.26   Zonisamide Level Quant       12   Keppra (Levetiracetam) Level       56 (H)       PHYSICAL EXAMINATION:   Blood pressure (!) 76/53, pulse 73, temperature 98.3  F (36.8  C), resp. rate 16.    GENERAL:  Normocephalic, atraumatic.   NECK:  Supple.   EXTREMITIES:  No edema.     NEUROLOGIC:  The patient is alert, nonverbal, no interaction, no eye contact.  No eye tracking. Pupils are equal, round and reactive to light.  Fundi exam unable to observe.  No facial weakness.   MOTOR EXAM:  Moving all extremities spontaneously.  Strength cannot be tested.  Deep tendon reflexes 2+ in " both patellar, biceps and brachioradialis.   COORDINATION:  Sensory exam not able to perform.   GAIT:  Not tested.  The patient is in a wheelchair.      IMPRESSION:     1.  Intractable epilepsy. Mr. Charles Lugo returns for follow up.  He is a 62-year-old male with a history of severe developmental delay and a longstanding intractable seizures.  He was previously seen by Dr. Kofi Xie at [place] and Dr. Bliss in [facility] and he was being referred for further evaluation and management to optimize his epilepsy care.  The patient is accompanied by his sister and 2 of the caregivers from the group home.  Patient was added with Zonegran since April 2017. He is now on Zonegran 300mg qhs, Dilantin 100-160, Keppra 1500 mg b.i.d. Gabapentin was tapered down to 300 mg t.i.d. Since we started on Zonegran, his seizures improved. Since the last visit about 7 months ago, he had about 11 seizures, all his typical CPs that lasted for 30 seconds each.  He was having 6-7 seizures per months before Zonegran was started.     2.  Severe developmental delay, stable.      PLAN:   1.  Continue Dilantin 100-160, Keppra 1500 mg b.i.d.  Zonegran 300 mg qhs.  2.  D/C Gabapentin.  3.  If patient has increased seizures, options in the future include increasing Dilantin or Zonegran.   4.  Return to clinic in 6 months.  Sister would like to cut daon more on his meds. May consider to taper Keppra in the near future.        25 min was spent on the visit.  Over 50% of the time was spent on education, counseling about optimal seizure control and coordination of care.

## 2018-08-09 ENCOUNTER — TELEPHONE (OUTPATIENT)
Dept: NEUROLOGY | Facility: CLINIC | Age: 63
End: 2018-08-09

## 2018-08-09 ENCOUNTER — OFFICE VISIT (OUTPATIENT)
Dept: NEUROLOGY | Facility: CLINIC | Age: 63
End: 2018-08-09
Payer: MEDICARE

## 2018-08-09 VITALS — SYSTOLIC BLOOD PRESSURE: 132 MMHG | TEMPERATURE: 98.8 F | DIASTOLIC BLOOD PRESSURE: 73 MMHG | HEART RATE: 85 BPM

## 2018-08-09 DIAGNOSIS — G40.909 SEIZURE DISORDER (H): Primary | ICD-10-CM

## 2018-08-09 NOTE — PROGRESS NOTES
[facility] Epilepsy Care Progress Note      Patient:  Charles Lugo  :  1955   Age:  63 year old   Today's Office Visit:  2018    Epilepsy Data:    Patient History  Primary Epileptologist/Provider: Monroe Escobedo M.D.  Patient Status: Chronic Intractable  Epilepsy Syndrome: Epilepsy unspecified  Epilepsy Syndrome Status: Undertermined - After Extensive Evaluation  Age of Onset: 4 years old  Other Relevant Dx/ Issues: severe developmental delay,      Tests/Surgery History  Last EE2017    Seizure Record  Current Visit Date: 18  Previous Visit Date: 18  Months since last visit: 2.73  Seizure Type 1: Unspecified Convulsion  Description of Sz Type 1: head will drop suddenly and then he may make some noises.  Then his arms will go up.  He will make fists with hands.  His eyes will roll back and sometimes he will have some leg kicking.  Then he will have convulsions with upper and lower extremities and his body.  Usually it lasts for 10 seconds to 45 seconds in duration  # of Type 1 Seizure since last visit: 8  Freq. Type 1 / Month: 2.93      Current Outpatient Prescriptions   Medication Sig Dispense Refill     alendronate (FOSAMAX) 70 MG tablet Take 70 mg by mouth every 7 days       Ascorbic Acid (VITAMIN C PO) Take 500 mg by mouth daily       Calcium Carb-Cholecalciferol (CALCIUM-VITAMIN D) 600-400 MG-UNIT TABS        Calcium-Magnesium 200-100 MG TABS Take 1 tab at night. 1 tablet 6     FOLIC ACID PO Take 1 mg by mouth daily       gentian violet 1 % solution Take 0.5 mLs by mouth as needed       levETIRAcetam (KEPPRA) 750 MG tablet Take 2 tablets (1,500 mg) by mouth 2 times daily 120 tablet 11     LEVOTHYROXINE SODIUM PO Take 88 mcg by mouth daily       Magnesium Hydroxide (MILK OF MAGNESIA PO)        PANTOPRAZOLE SODIUM PO Take 40 mg by mouth daily       phenylephrine-shark liver oil-mineral oil-petrolatum (PREPARATION H) 0.25-14-74.9 % rectal ointment Place rectally 2 times daily as needed  "for hemorrhoids       phenytoin (DILANTIN) 100 MG CR capsule Take 1 cap po BID ( together with 30 mg cap as directed) 60 capsule 11     phenytoin (DILANTIN) 30 MG CR capsule Take 2 capsules (60 mg) by mouth daily Two 30 mg tabs at bedtime with one 100 mg tab in the hs 60 capsule 11     Polysaccharide Iron Complex (FERREX 150 PO)        study - bisacodyl (IDS# 4779) 10 MG Suppository Place 10 mg rectally daily as needed for constipation       zonisamide (ZONEGRAN) 100 MG capsule 300 mg at bedtime. 90 capsule 11         CC: Seizures    HPI: Mr. Charles Lugo returns for follow up.  He is a 63-year-old male with a history of severe developmental delay and a longstanding intractable seizures.  He was previously seen by Dr. Kofi Xie at [place] and Dr. Bliss in [place] area and he was being referred for further evaluation and management to optimize his epilepsy care.  The patient is accompanied by his sister and caregivers from the group home.  Patient was added with Zonegran since April 2017. He is now on Zonegran 300mg qhs, Dilantin 100-160, Keppra 1500 mg b.i.d. Gabapentin was stopped after the last visit. Since we started on Zonegran, his seizures improved. For the past 4 months,, he had about 8 seizures, all his typical CPs that lasted for 30 seconds each except for one that lasted for 10 min.  He was sent to ED after the prolonged one.  He was having 6-7 seizures per months before Zonegran was started.    Sister complains of patient no sleeping well. She would like him to try Andre-Mag tabs. She think that this will help him with the \"restless leg syndrome\".      The patient has a history of severe developmental delay.  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  From the limited information we obtained so far he has never had good seizure control in the past.  He has a long history of intractable seizures since the age of 4 " years old.  He is currently taking Dilantin, Keppra and gabapentin for his seizure control.  He is on Dilantin 100-160, Keppra 1500 mg b.i.d. and gabapentin 900 mg t.i.d.  His caregiver and sisters do not know what other anti-seizure medications he had in the past.      According to the caregiver, the patient only has 1 type of seizure.  During a seizure, his head will drop suddenly and then he may make some noises.  Then his arms will go up.  He will make fists with hands.  His eyes will roll back and sometimes he will have some leg kicking.  Then he will have convulsions with upper and lower extremities and his body.  Usually it lasts for 10 seconds to 45 seconds in duration.  At present time it happens once to twice a week.  The caregiver felt that his seizure frequency has never changed significantly for the past 5 years since she was his main caregiver.      Caregiver did not notice any significant side effects from the medications because the patient is nonverbal.  The patient did not have any MRI scan of his brain in the past and it is unclear when he had his last EEG done.      TRIGGERS FOR SEIZURES:  Unclear.      RISK FACTORS FOR SEIZURES:  He has a history of trauma with severe developmental delay after birth.  No history of stroke.  No history of brain tumor.  No history of CNS infection.  No history of febrile convulsions.      Current Outpatient Prescriptions   Medication Sig Dispense Refill     alendronate (FOSAMAX) 70 MG tablet Take 70 mg by mouth every 7 days       Ascorbic Acid (VITAMIN C PO) Take 500 mg by mouth daily       Calcium Carb-Cholecalciferol (CALCIUM-VITAMIN D) 600-400 MG-UNIT TABS        FOLIC ACID PO Take 1 mg by mouth daily       gentian violet 1 % solution Take 0.5 mLs by mouth as needed       levETIRAcetam (KEPPRA) 750 MG tablet Take 2 tablets (1,500 mg) by mouth 2 times daily 120 tablet 11     LEVOTHYROXINE SODIUM PO Take 88 mcg by mouth daily       Magnesium Hydroxide (MILK OF  "MAGNESIA PO)        PANTOPRAZOLE SODIUM PO Take 40 mg by mouth daily       phenylephrine-shark liver oil-mineral oil-petrolatum (PREPARATION H) 0.25-14-74.9 % rectal ointment Place rectally 2 times daily as needed for hemorrhoids       phenytoin (DILANTIN) 100 MG CR capsule Take 1 cap po BID ( together with 30 mg cap as directed) 60 capsule 11     phenytoin (DILANTIN) 30 MG CR capsule Take 2 capsules (60 mg) by mouth daily Two 30 mg tabs at bedtime with one 100 mg tab in the hs 60 capsule 11     Polysaccharide Iron Complex (FERREX 150 PO)        study - bisacodyl (IDS# 4779) 10 MG Suppository Place 10 mg rectally daily as needed for constipation       zonisamide (ZONEGRAN) 100 MG capsule 300 mg at bedtime. 90 capsule 11     PAST AEDs:  Unclear.    PAST MEDICAL HISTORY:   1.  Intractable epilepsy.   2.  Severe developmental delay.      PAST SURGICAL HISTORY:  Teeth extraction.      ALLERGIES:  Debrox.      FAMILY HISTORY:  Positive for bone cancer in father.  Brother has history of hypertension.      SOCIAL HISTORY:  He lives in a group home.  No education in the past.  He is nonverbal and has no interaction with surroundings.  No smoking, no drugs, no alcohol.  He has 2 sisters for his main social support.      REVIEW OF SYSTEMS:  Positive for \"strong urine smell\".  The rest of the 8-point review of systems is negative as per the caregiver.      PREVIOUS DIAGNOSTIC TESTING:     EEG (4/3/2017)   IMPRESSION:  This is a markedly abnormal EEG due to the presence of moderate to severe generalized slowing of the background activities.  There were also intermittent generalized and left temporal focal epileptiform activities during this recording.  These findings are diagnostic of an epileptic disorder.  However, it is unclear at this time whether the patient has generalized epilepsy versus focal epilepsy.     Component      Latest Ref Rng & Units 7/17/2017   Phenytoin Level      10 - 20 mg/L 17.8   Phenytoin Free       " "1.26   Zonisamide Level Quant       12   Keppra (Levetiracetam) Level       56 (H)       PHYSICAL EXAMINATION:   Blood pressure 132/73, pulse 85, temperature 98.8  F (37.1  C).    GENERAL:  Normocephalic, atraumatic.   NECK:  Supple.   EXTREMITIES:  No edema.     NEUROLOGIC:  The patient is alert, nonverbal, no interaction, no eye contact.  No eye tracking. Pupils are equal, round and reactive to light.  Fundi exam unable to observe.  No facial weakness.   MOTOR EXAM:  Moving all extremities spontaneously.  Strength cannot be tested.  Deep tendon reflexes 2+ in both patellar, biceps and brachioradialis.   COORDINATION:  Sensory exam not able to perform.   GAIT:  Not tested.  The patient is in a wheelchair.      IMPRESSION:     1.  Intractable epilepsy. He is a 63-year-old male with a history of severe developmental delay and a longstanding intractable seizures.  He was previously seen by Dr. Kofi Xie at [place] and Dr. Bliss in [facility] and he was being referred for further evaluation and management to optimize his epilepsy care.  The patient is accompanied by his sister and caregivers from the group home.  Patient was added with Zonegran since April 2017. He is now on Zonegran 300mg qhs, Dilantin 100-160, Keppra 1500 mg b.i.d. Gabapentin was tapered down to 300 mg t.i.d. Since we started on Zonegran, his seizures improved. For the past 4 months,, he had about 8 seizures, all his typical CPs that lasted for 30 seconds each except for one that lasted for 10 min.  He was sent to ED after the prolonged one.  He was having 6-7 seizures per months before Zonegran was started.    Sister complains of patient no sleeping well. She would like him to try Andre-Mag tabs. She think that this will help him with the \"restless leg syndrome\".     2.  Severe developmental delay, stable.      PLAN:   1.  Continue Dilantin 100-160, Keppra 1500 mg b.i.d.  Zonegran 300 mg qhs.  2.  Can try OTC Senokot for constipation.  3.  If " patient has increased seizures, options in the future include increasing Dilantin or Zonegran.   4.  Return to clinic in 6 months.       30 min was spent on the visit.  Over 50% of the time was spent on education, counseling about optimal seizure control and coordination of care.

## 2018-08-09 NOTE — LETTER
2018     RE: Charles Lugo  : 1955   MRN: [id number]      Dear Colleague,    Thank you for referring your patient, Charles Lugo, to the [facility] EPILEPSY CARE at [facility]. Please see a copy of my visit note below.    [facility]      Patient:  Charles Lugo  :  1955   Age:  63 year old   Today's Office Visit:  2018    Epilepsy Data:    Patient History  Primary Epileptologist/Provider: Monroe Escobedo M.D.  Patient Status: Chronic Intractable  Epilepsy Syndrome: Epilepsy unspecified  Epilepsy Syndrome Status: Undertermined - After Extensive Evaluation  Age of Onset: 4 years old  Other Relevant Dx/ Issues: severe developmental delay,      Tests/Surgery History  Last EE2017    Seizure Record  Current Visit Date: 18  Previous Visit Date: 18  Months since last visit: 2.73  Seizure Type 1: Unspecified Convulsion  Description of Sz Type 1: head will drop suddenly and then he may make some noises.  Then his arms will go up.  He will make fists with hands.  His eyes will roll back and sometimes he will have some leg kicking.  Then he will have convulsions with upper and lower extremities and his body.  Usually it lasts for 10 seconds to 45 seconds in duration  # of Type 1 Seizure since last visit: 8  Freq. Type 1 / Month: 2.93      Current Outpatient Prescriptions   Medication Sig Dispense Refill     alendronate (FOSAMAX) 70 MG tablet Take 70 mg by mouth every 7 days       Ascorbic Acid (VITAMIN C PO) Take 500 mg by mouth daily       Calcium Carb-Cholecalciferol (CALCIUM-VITAMIN D) 600-400 MG-UNIT TABS        Calcium-Magnesium 200-100 MG TABS Take 1 tab at night. 1 tablet 6     FOLIC ACID PO Take 1 mg by mouth daily       gentian violet 1 % solution Take 0.5 mLs by mouth as needed       levETIRAcetam (KEPPRA) 750 MG tablet Take 2 tablets (1,500 mg) by mouth 2 times daily 120 tablet 11     LEVOTHYROXINE SODIUM PO Take  "88 mcg by mouth daily       Magnesium Hydroxide (MILK OF MAGNESIA PO)        PANTOPRAZOLE SODIUM PO Take 40 mg by mouth daily       phenylephrine-shark liver oil-mineral oil-petrolatum (PREPARATION H) 0.25-14-74.9 % rectal ointment Place rectally 2 times daily as needed for hemorrhoids       phenytoin (DILANTIN) 100 MG CR capsule Take 1 cap po BID ( together with 30 mg cap as directed) 60 capsule 11     phenytoin (DILANTIN) 30 MG CR capsule Take 2 capsules (60 mg) by mouth daily Two 30 mg tabs at bedtime with one 100 mg tab in the hs 60 capsule 11     Polysaccharide Iron Complex (FERREX 150 PO)        study - bisacodyl (IDS# 4779) 10 MG Suppository Place 10 mg rectally daily as needed for constipation       zonisamide (ZONEGRAN) 100 MG capsule 300 mg at bedtime. 90 capsule 11         CC: Seizures    HPI: Mr. Charles Lugo returns for follow up.  He is a 63-year-old male with a history of severe developmental delay and a longstanding intractable seizures.  He was previously seen by Dr. Kofi Xie at [place] and Dr. Bliss in [facility] and he was being referred for further evaluation and management to optimize his epilepsy care.  The patient is accompanied by his sister and caregivers from the group home.  Patient was added with Zonegran since April 2017. He is now on Zonegran 300mg qhs, Dilantin 100-160, Keppra 1500 mg b.i.d. Gabapentin was stopped after the last visit. Since we started on Zonegran, his seizures improved. For the past 4 months,, he had about 8 seizures, all his typical CPs that lasted for 30 seconds each except for one that lasted for 10 min.  He was sent to ED after the prolonged one.  He was having 6-7 seizures per months before Zonegran was started.    Sister complains of patient no sleeping well. She would like him to try Andre-Mag tabs. She think that this will help him with the \"restless leg syndrome\".      The patient has a history of severe developmental delay.  He reportedly suffered " from birth trauma and was born with severe mental retardation and his seizure onset was around 4 years old.  From the limited information we obtained so far he has never had good seizure control in the past.  He has a long history of intractable seizures since the age of 4 years old.  He is currently taking Dilantin, Keppra and gabapentin for his seizure control.  He is on Dilantin 100-160, Keppra 1500 mg b.i.d. and gabapentin 900 mg t.i.d.  His caregiver and sisters do not know what other anti-seizure medications he had in the past.      According to the caregiver, the patient only has 1 type of seizure.  During a seizure, his head will drop suddenly and then he may make some noises.  Then his arms will go up.  He will make fists with hands.  His eyes will roll back and sometimes he will have some leg kicking.  Then he will have convulsions with upper and lower extremities and his body.  Usually it lasts for 10 seconds to 45 seconds in duration.  At present time it happens once to twice a week.  The caregiver felt that his seizure frequency has never changed significantly for the past 5 years since she was his main caregiver.      Caregiver did not notice any significant side effects from the medications because the patient is nonverbal.  The patient did not have any MRI scan of his brain in the past and it is unclear when he had his last EEG done.      TRIGGERS FOR SEIZURES:  Unclear.      RISK FACTORS FOR SEIZURES:  He has a history of trauma with severe developmental delay after birth.  No history of stroke.  No history of brain tumor.  No history of CNS infection.  No history of febrile convulsions.      Current Outpatient Prescriptions   Medication Sig Dispense Refill     alendronate (FOSAMAX) 70 MG tablet Take 70 mg by mouth every 7 days       Ascorbic Acid (VITAMIN C PO) Take 500 mg by mouth daily       Calcium Carb-Cholecalciferol (CALCIUM-VITAMIN D) 600-400 MG-UNIT TABS        FOLIC ACID PO Take 1 mg by  "mouth daily       gentian violet 1 % solution Take 0.5 mLs by mouth as needed       levETIRAcetam (KEPPRA) 750 MG tablet Take 2 tablets (1,500 mg) by mouth 2 times daily 120 tablet 11     LEVOTHYROXINE SODIUM PO Take 88 mcg by mouth daily       Magnesium Hydroxide (MILK OF MAGNESIA PO)        PANTOPRAZOLE SODIUM PO Take 40 mg by mouth daily       phenylephrine-shark liver oil-mineral oil-petrolatum (PREPARATION H) 0.25-14-74.9 % rectal ointment Place rectally 2 times daily as needed for hemorrhoids       phenytoin (DILANTIN) 100 MG CR capsule Take 1 cap po BID ( together with 30 mg cap as directed) 60 capsule 11     phenytoin (DILANTIN) 30 MG CR capsule Take 2 capsules (60 mg) by mouth daily Two 30 mg tabs at bedtime with one 100 mg tab in the hs 60 capsule 11     Polysaccharide Iron Complex (FERREX 150 PO)        study - bisacodyl (IDS# 4779) 10 MG Suppository Place 10 mg rectally daily as needed for constipation       zonisamide (ZONEGRAN) 100 MG capsule 300 mg at bedtime. 90 capsule 11     PAST AEDs:  Unclear.    PAST MEDICAL HISTORY:   1.  Intractable epilepsy.   2.  Severe developmental delay.      PAST SURGICAL HISTORY:  Teeth extraction.      ALLERGIES:  Debrox.      FAMILY HISTORY:  Positive for bone cancer in father.  Brother has history of hypertension.      SOCIAL HISTORY:  He lives in a group home.  No education in the past.  He is nonverbal and has no interaction with surroundings.  No smoking, no drugs, no alcohol.  He has 2 sisters for his main social support.      REVIEW OF SYSTEMS:  Positive for \"strong urine smell\".  The rest of the 8-point review of systems is negative as per the caregiver.      PREVIOUS DIAGNOSTIC TESTING:     EEG (4/3/2017)   IMPRESSION:  This is a markedly abnormal EEG due to the presence of moderate to severe generalized slowing of the background activities.  There were also intermittent generalized and left temporal focal epileptiform activities during this recording.  These " findings are diagnostic of an epileptic disorder.  However, it is unclear at this time whether the patient has generalized epilepsy versus focal epilepsy.     Component      Latest Ref Rng & Units 7/17/2017   Phenytoin Level      10 - 20 mg/L 17.8   Phenytoin Free       1.26   Zonisamide Level Quant       12   Keppra (Levetiracetam) Level       56 (H)       PHYSICAL EXAMINATION:   Blood pressure 132/73, pulse 85, temperature 98.8  F (37.1  C).    GENERAL:  Normocephalic, atraumatic.   NECK:  Supple.   EXTREMITIES:  No edema.     NEUROLOGIC:  The patient is alert, nonverbal, no interaction, no eye contact.  No eye tracking. Pupils are equal, round and reactive to light.  Fundi exam unable to observe.  No facial weakness.   MOTOR EXAM:  Moving all extremities spontaneously.  Strength cannot be tested.  Deep tendon reflexes 2+ in both patellar, biceps and brachioradialis.   COORDINATION:  Sensory exam not able to perform.   GAIT:  Not tested.  The patient is in a wheelchair.      IMPRESSION:     1.  Intractable epilepsy. He is a 63-year-old male with a history of severe developmental delay and a longstanding intractable seizures.  He was previously seen by Dr. Kofi Xie at [place] and Dr. Bliss in [facility] and he was being referred for further evaluation and management to optimize his epilepsy care.  The patient is accompanied by his sister and caregivers from the group home.  Patient was added with Zonegran since April 2017. He is now on Zonegran 300mg qhs, Dilantin 100-160, Keppra 1500 mg b.i.d. Gabapentin was tapered down to 300 mg t.i.d. Since we started on Zonegran, his seizures improved. For the past 4 months,, he had about 8 seizures, all his typical CPs that lasted for 30 seconds each except for one that lasted for 10 min.  He was sent to ED after the prolonged one.  He was having 6-7 seizures per months before Zonegran was started.    Sister complains of patient no sleeping well. She would like him to  "try Andre-Mag tabs. She think that this will help him with the \"restless leg syndrome\".     2.  Severe developmental delay, stable.      PLAN:   1.  Continue Dilantin 100-160, Keppra 1500 mg b.i.d.  Zonegran 300 mg qhs.  2.  Can try OTC Senokot for constipation.  3.  If patient has increased seizures, options in the future include increasing Dilantin or Zonegran.   4.  Return to clinic in 6 months.     30 min was spent on the visit.  Over 50% of the time was spent on education, counseling about optimal seizure control and coordination of care.    Again, thank you for allowing me to participate in the care of your patient.      Sincerely,  Monroe Escobedo MD  "

## 2018-08-09 NOTE — MR AVS SNAPSHOT
After Visit Summary   8/9/2018    Charles Lugo    MRN: [id number]           Patient Information     Date Of Birth          1955        Visit Information        Provider Department      8/9/2018 2:00 PM Monroe Escobedo MD [facility]        Today's Diagnoses     Seizure disorder (H)    -  1      Care Instructions      Times of Days  am pm noon       Medication Tablet Size Number of Tablets/Capsules Total Daily Dosage    Keppra  750  2  2        3000 mg    Dilantin  100  1 1        200 mg    Dilantin  30   2        60 mg    Zonegran  100   3        300 mg    Gabapentin  300 1  1 1      900 mg                                                           Carry this with you at all times.  CONTINUE TAKING YOUR OTHER MEDICATIONS AS PREVIOUSLY DIRECTED.      * * *Do not store medications in the bathroom.  Keep medications away from children!* * *             Follow-ups after your visit        Follow-up notes from your care team     Return in about 6 months (around 2/9/2019).      Your next 10 appointments already scheduled     Feb 11, 2019  2:00 PM CST   Return Visit with MD [facility] ([facility])    [address] [place] [address] [place], [address]  [facility] [address] [phone]              Who to contact     Please call your clinic at [phone] to:    Ask questions about your health    Make or cancel appointments    Discuss your medicines    Learn about your test results    Speak to your doctor            Additional Information About Your Visit        MyChart Information     [employer] is an electronic gateway that provides easy, online access to your medical records. With [employer], you can request a clinic appointment, read your test results, renew a prescription or communicate with your care team.     To sign up for [employer] visit the website at [email or web address]   You will be asked to enter the access code listed below, as well as some personal  information. Please follow the directions to create your username and password.     Your access code is: [email or web address]  Expires: 2018  1:18 PM     Your access code will  in 90 days. If you need help or a new code, please contact your [facility] or call [phone] for assistance.        Care EveryWhere ID     This is your Care EveryWhere ID. This could be used by other organizations to access your [facility] medical records  [id number]        Your Vitals Were     Pulse Temperature                85 98.8  F (37.1  C)           Blood Pressure from Last 3 Encounters:   18 132/73   18 (!) 76/53   10/30/17 103/61    Weight from Last 3 Encounters:   17 134 lb (60.8 kg)   17 139 lb 8 oz (63.3 kg)              Today, you had the following     No orders found for display         Today's Medication Changes          These changes are accurate as of 18  2:57 PM.  If you have any questions, ask your nurse or doctor.               Start taking these medicines.        Dose/Directions    Calcium-Magnesium 200-100 MG Tabs   Used for:  Seizure disorder (H)   Started by:  Monroe Escobedo MD        Take 1 tab at night.   Quantity:  1 tablet   Refills:  6            Where to get your medicines      These medications were sent to [facility] [address]  [address] [facility] [address] [address]     Phone:  [phone]     Calcium-Magnesium 200-100 MG Tabs                Primary Care Provider Office Phone # Fax #    Vanessa Gonzalez [phone]       [address] [address]        Equal Access to Services     MARCY GONZALES : Hadii steven mahoney Somariusz, waaxda luqadaha, qaybta kaalmada lucia, [place] linares. So [facility] [phone].    ATENCIÓN: Si habla español, tiene a pool disposición servicios gratuitos de asistencia lingüística. Llame al [phone].    We comply with  applicable federal civil rights laws and [place] laws. We do not discriminate on the basis of race, color, national origin, age, disability, sex, sexual orientation, or gender identity.            Thank you!     Thank you for choosing [facility]  for your care. Our goal is always to provide you with excellent care. Hearing back from our patients is one way we can continue to improve our services. Please take a few minutes to complete the written survey that you may receive in the mail after your visit with us. Thank you!             Your Updated Medication List - Protect others around you: Learn how to safely use, store and throw away your medicines at www.disposemymeds.org.          This list is accurate as of 8/9/18  2:57 PM.  Always use your most recent med list.                   Brand Name Dispense Instructions for use Diagnosis    alendronate 70 MG tablet    FOSAMAX     Take 70 mg by mouth every 7 days        Calcium-Magnesium 200-100 MG Tabs     1 tablet    Take 1 tab at night.    Seizure disorder (H)       Calcium-Vitamin D 600-400 MG-UNIT Tabs           FERREX 150 PO           FOLIC ACID PO      Take 1 mg by mouth daily        gentian violet 1 % solution      Take 0.5 mLs by mouth as needed        levETIRAcetam 750 MG tablet    KEPPRA    120 tablet    Take 2 tablets (1,500 mg) by mouth 2 times daily    Seizure syndrome (H)       LEVOTHYROXINE SODIUM PO      Take 88 mcg by mouth daily        MILK OF MAGNESIA PO           PANTOPRAZOLE SODIUM PO      Take 40 mg by mouth daily        * phenytoin 30 MG CR capsule    DILANTIN    60 capsule    Take 2 capsules (60 mg) by mouth daily Two 30 mg tabs at bedtime with one 100 mg tab in the hs    Seizure syndrome (H)       * phenytoin 100 MG CR capsule    DILANTIN    60 capsule    Take 1 cap po BID ( together with 30 mg cap as directed)    Seizure syndrome (H)       PREPARATION H 0.25-14-74.9 % rectal ointment   Generic drug:  phenylephrine-shark liver  oil-mineral oil-petrolatum      Place rectally 2 times daily as needed for hemorrhoids        study - bisacodyl 10 MG Suppository    IDS# 4779     Place 10 mg rectally daily as needed for constipation        VITAMIN C PO      Take 500 mg by mouth daily        zonisamide 100 MG capsule    ZONEGRAN    90 capsule    300 mg at bedtime.    Seizure disorder (H)       * Notice:  This list has 2 medication(s) that are the same as other medications prescribed for you. Read the directions carefully, and ask your doctor or other care provider to review them with you.

## 2018-08-09 NOTE — TELEPHONE ENCOUNTER
8/9/2018      Times of Days    AM   PM   Noon       Medication Tablet Size Number of Tablets/Capsules Total Daily Dosage    Keppra 750 2 2       3000 mg    Dilantin 100 1 1       200 mg    Dilantin 30   2       60 mg    Zonegram 100   3       300 mg    Senocot 8.6           8.6 mg PRN                                                           Carry this with you at all times.  CONTINUE TAKING YOUR OTHER MEDICATIONS AS PREVIOUSLY DIRECTED.      * * *Do not store medications in the bathroom.  Keep medications away from children!* * *

## 2018-08-21 ENCOUNTER — TELEPHONE (OUTPATIENT)
Dept: NEUROLOGY | Facility: CLINIC | Age: 63
End: 2018-08-21

## 2018-08-21 NOTE — TELEPHONE ENCOUNTER
Caller: Rachel   Relationship to Patient: Pharmacy worker   Call Back Number: [phone]   Reason for Call: Pharmacy calling about Calcium-Magnesium 200-100 MG TABS medication    RX for Calcium-Magnesium 200-100 sent to pharmacy.    Received request from group [facility] to refill Milk of Magnesia, Melatonin and Calcium with Vitamin D.   Advised to contact PCP office for these refills.

## 2018-08-22 DIAGNOSIS — G40.909 SEIZURE DISORDER (H): ICD-10-CM

## 2018-08-22 DIAGNOSIS — G25.81 RESTLESS LEGS SYNDROME: Primary | ICD-10-CM

## 2018-09-04 ENCOUNTER — TELEPHONE (OUTPATIENT)
Dept: NEUROLOGY | Facility: CLINIC | Age: 63
End: 2018-09-04

## 2018-09-04 DIAGNOSIS — G25.81 RESTLESS LEGS SYNDROME: Primary | ICD-10-CM

## 2018-09-04 DIAGNOSIS — G40.909 SEIZURE DISORDER (H): Primary | ICD-10-CM

## 2018-09-04 RX ORDER — MAGNESIUM GLYCINATE 100 MG
100 TABLET ORAL DAILY
Qty: 30 TABLET | Refills: 2 | Status: SHIPPED | OUTPATIENT
Start: 2018-09-04 | End: 2018-09-05

## 2018-09-04 NOTE — TELEPHONE ENCOUNTER
[facility] Call Center    Phone Message    May a detailed message be left on voicemail: yes    Reason for Call: Other: Jimena from [facility] calling stating that they do not carry Magnesium Bisglycinate 100 MG TABS and she was suggesting magnesium oxide instead, which they do carry, but it only comes in 400mg tabs. Please call to discuss.    Action Taken: Message routed to:  [facility] ([facility]

## 2018-09-05 NOTE — TELEPHONE ENCOUNTER
Product information provided by :    [facility] Leg Cramps Ointment    Uses: Temporarily relieves the symptoms of cramps and pains in legs and calves.    Active Ingredients  Purposes:    Aconitum Napellus 3X HPUS: pains in joints, legs  Arnica Montana 3X HPUS: stiffness, muscle soreness and pain  Ledum Palustre 3X HPUS: pain and cramps in legs  Magnesia Phosphorica 10X HPUS: cramps in calves  Rhus Toxicodendron 6X HPUS: pains and stiffness  Viscum Album 3X HPUS: pains in joints     HPUS  indicates the active ingredients are in the official Homeopathic Pharmacopoeia of the [place].    Inactive Ingredients: Inactive Ingredients: Beeswax, Cetearyl Alcohol, Citric Acid, Potassium Sorbate, Purified Water, Sodium Benzoate, Sodium Stearoyl Glutamate, Vegetable Glycerine, Vegetable Oil, Wintergreen Extract, Xanthan Gum..    Warnings:    For external use only.    If pregnant or breast-feeding, ask a health professional before use.    Do not use if tube seal is broken or missing.    If symptoms persist for more than 7 days or worsen, contact a licensed health care provider.    Keep this and all medications out of the reach of children.    In case of emergency, contact a medical professional or poison control center immediately.  [facility] may also be contacted for emergency information about our products 24 hours a day, 7 days per week at [phone].

## 2018-09-05 NOTE — TELEPHONE ENCOUNTER
Pharmacy is unable to provide magnesium supplement in the doseage desired by MD. They can provide alternatives; of a 400 mg mag-oxide tab that can be cut in half for a dose of 200 mg or they can do mag oxide with calcium 250-47 which can be also cut in half. These options were discussed with Dr. Escobedo whom gave verbal orders to cancel the magnesium supplement and defer the treatment management to his primary care provider.     This nurse contacted [facility] and spoke with his care provider, Dipti. She states understanding of Dr. Escobedo's advisement to cancel the magnesium order. Dipti states that SHE will contact the patient's sister and ask her to call us if she has further questions.

## 2018-09-05 NOTE — TELEPHONE ENCOUNTER
Carlos GONSALEZ, Dr. Saunders, is not comfortable prescribing this medication. Patient's sister, Cyn, was notified.

## 2018-09-05 NOTE — TELEPHONE ENCOUNTER
"Patient's sister, Cyn, calls the clinic today to discuss a request that was sent by the nurses at [facility]. This nurse was able to locate the request for \"Leg Cramp Ointment\" to be applied as needed for restless legs twice during the night.     Will discuss this with MD and get back to Cyn.   "

## 2018-09-06 NOTE — TELEPHONE ENCOUNTER
"Chart discussed with Dr. Escobedo. We reviewed that the patient's primary care physician is unwilling to prescribe treatment for RLS. Dr. Escobedo does not approve the use of the \"Leg Cramps Ointment\" due to the unknown effects of the herbal ingredients. He gives verbal orders for the patient to start therapy with an alternative to the magnesium supplement; mirapex 0.125 mg at bedtime. I updated Ghada at [facility]; a message was left for Cyn to return my call to discuss this treatment option.     "

## 2018-09-07 RX ORDER — PRAMIPEXOLE DIHYDROCHLORIDE 0.12 MG/1
0.12 TABLET ORAL AT BEDTIME
Qty: 90 TABLET | Refills: 1 | Status: SHIPPED | OUTPATIENT
Start: 2018-09-07 | End: 2019-02-13

## 2018-09-07 NOTE — TELEPHONE ENCOUNTER
This nurse discussed the treatment with mirapex 0.125 with the patient's sister Cyn. She is hesitantly agreeable to trying the medication. I advised Cyn and Ghada of the medication potential side effects. I requested per Dr. Escobedo's recommendation that Charles return to clinic in 6-8 weeks to review how the treatment is going. Cyn asks if we can do a telephone visit instead. I reviewed with her that it will be a billable visit. She requests that I arrange a telephone visit with Charles Urrutia's .     All questions were answered to Ghada's satisfaction.

## 2019-02-08 DIAGNOSIS — G25.81 RESTLESS LEGS SYNDROME: ICD-10-CM

## 2019-02-13 RX ORDER — PRAMIPEXOLE DIHYDROCHLORIDE 0.12 MG/1
0.12 TABLET ORAL AT BEDTIME
Qty: 90 TABLET | Refills: 1 | Status: SHIPPED | OUTPATIENT
Start: 2019-02-13 | End: 2019-08-13

## 2019-04-08 ENCOUNTER — OFFICE VISIT (OUTPATIENT)
Dept: NEUROLOGY | Facility: CLINIC | Age: 64
End: 2019-04-08
Payer: MEDICARE

## 2019-04-08 DIAGNOSIS — G40.909 RECURRENT SEIZURES (H): Primary | ICD-10-CM

## 2019-04-08 NOTE — PROGRESS NOTES
[facility] Progress Note      Patient:  Charles Lugo  :  1955   Age:  63 year old   Today's Office Visit:  2019    Epilepsy Data:    Patient History  Primary Epileptologist/Provider: Monroe Escobedo M.D.  Patient Status: Chronic Intractable  Epilepsy Syndrome: Epilepsy unspecified  Epilepsy Syndrome Status: Undertermined - After Extensive Evaluation  Age of Onset: 4 years old  Other Relevant Dx/ Issues: severe developmental delay,      Tests/Surgery History  Last EE2017    Seizure Record  Current Visit Date: 19    Seizure Type 1: Unspecified Convulsion  Description of Sz Type 1: head will drop suddenly and then he may make some noises.  Then his arms will go up.  He will make fists with hands.  His eyes will roll back and sometimes he will have some leg kicking.  Then he will have convulsions with upper and lower extremities and his body.  Usually it lasts for 10 seconds to 45 seconds in duration  # of Type 1 Seizure for the past 12 months: 24  Freq. Type 1 / Month: 3      Current Outpatient Medications   Medication Sig Dispense Refill     alendronate (FOSAMAX) 70 MG tablet Take 70 mg by mouth every 7 days       Ascorbic Acid (VITAMIN C PO) Take 500 mg by mouth daily       Calcium Carb-Cholecalciferol (CALCIUM-VITAMIN D) 600-400 MG-UNIT TABS        FOLIC ACID PO Take 1 mg by mouth daily       gentian violet 1 % solution Take 0.5 mLs by mouth as needed       levETIRAcetam (KEPPRA) 750 MG tablet Take 2 tablets (1,500 mg) by mouth 2 times daily 120 tablet 11     LEVOTHYROXINE SODIUM PO Take 88 mcg by mouth daily       Magnesium Hydroxide (MILK OF MAGNESIA PO)        PANTOPRAZOLE SODIUM PO Take 40 mg by mouth daily       phenylephrine-shark liver oil-mineral oil-petrolatum (PREPARATION H) 0.25-14-74.9 % rectal ointment Place rectally 2 times daily as needed for hemorrhoids       phenytoin (DILANTIN) 100 MG CR capsule Take 1 cap po BID ( together with 30 mg cap as directed) 60 capsule  "11     phenytoin (DILANTIN) 30 MG CR capsule Take 2 capsules (60 mg) by mouth daily Two 30 mg tabs at bedtime with one 100 mg tab in the hs (Patient taking differently: Take 30 mg by mouth daily Two 30 mg tabs at bedtime with one 100 mg tab in the hs) 60 capsule 11     Polysaccharide Iron Complex (FERREX 150 PO)        pramipexole (MIRAPEX) 0.125 MG tablet Take 1 tablet (0.125 mg) by mouth At Bedtime 90 tablet 1     study - bisacodyl (IDS# 4779) 10 MG Suppository Place 10 mg rectally daily as needed for constipation       zonisamide (ZONEGRAN) 100 MG capsule 300 mg at bedtime. 90 capsule 11          CC: Seizures    HPI: Mr. Charles Lugo returns for follow up.  He is a 63-year-old male with a history of severe developmental delay and a longstanding intractable seizures.  He was previously seen by Dr. Kofi Xie at [facility] and Dr. Bliss in [facility] and he was being referred for further evaluation and management to optimize his epilepsy care.  The patient is accompanied by his sister and caregivers from the group home.  Patient was added with Zonegran since April 2017. He is now on Zonegran 300mg qhs, Dilantin 100-160, Keppra 1500 mg b.i.d. Gabapentin was stopped after the last visit. Since we started on Zonegran, his seizures improved. For the past 12 months, he had about 24 seizures, all his typical CPs that lasted for 30 seconds each except for one that lasted for 10 min.  He was sent to ED after a prolonged one.  He was having 6-7 seizures per months before Zonegran was started.    Sister complains of patient no sleeping well. She would like him to try Andre-Mag tabs. She think that this will help him with the \"restless leg syndrome\".      The patient has a history of severe developmental delay.  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  From the limited information we obtained so far he has never had good seizure " control in the past.  He has a long history of intractable seizures since the age of 4 years old.  He is currently taking Dilantin, Keppra and gabapentin for his seizure control.  He is on Dilantin 100-160, Keppra 1500 mg b.i.d. and gabapentin 900 mg t.i.d.  His caregiver and sisters do not know what other anti-seizure medications he had in the past.      According to the caregiver, the patient only has 1 type of seizure.  During a seizure, his head will drop suddenly and then he may make some noises.  Then his arms will go up.  He will make fists with hands.  His eyes will roll back and sometimes he will have some leg kicking.  Then he will have convulsions with upper and lower extremities and his body.  Usually it lasts for 10 seconds to 45 seconds in duration.  At present time it happens once to twice a week.  The caregiver felt that his seizure frequency has never changed significantly for the past 5 years since she was his main caregiver.      Caregiver did not notice any significant side effects from the medications because the patient is nonverbal.  The patient did not have any MRI scan of his brain in the past and it is unclear when he had his last EEG done.      TRIGGERS FOR SEIZURES:  Unclear.      RISK FACTORS FOR SEIZURES:  He has a history of trauma with severe developmental delay after birth.  No history of stroke.  No history of brain tumor.  No history of CNS infection.  No history of febrile convulsions.      Current Outpatient Medications   Medication Sig Dispense Refill     alendronate (FOSAMAX) 70 MG tablet Take 70 mg by mouth every 7 days       Ascorbic Acid (VITAMIN C PO) Take 500 mg by mouth daily       Calcium Carb-Cholecalciferol (CALCIUM-VITAMIN D) 600-400 MG-UNIT TABS        FOLIC ACID PO Take 1 mg by mouth daily       gentian violet 1 % solution Take 0.5 mLs by mouth as needed       levETIRAcetam (KEPPRA) 750 MG tablet Take 2 tablets (1,500 mg) by mouth 2 times daily 120 tablet 11      "LEVOTHYROXINE SODIUM PO Take 88 mcg by mouth daily       Magnesium Hydroxide (MILK OF MAGNESIA PO)        PANTOPRAZOLE SODIUM PO Take 40 mg by mouth daily       phenylephrine-shark liver oil-mineral oil-petrolatum (PREPARATION H) 0.25-14-74.9 % rectal ointment Place rectally 2 times daily as needed for hemorrhoids       phenytoin (DILANTIN) 100 MG CR capsule Take 1 cap po BID ( together with 30 mg cap as directed) 60 capsule 11     phenytoin (DILANTIN) 30 MG CR capsule Take 2 capsules (60 mg) by mouth daily Two 30 mg tabs at bedtime with one 100 mg tab in the hs (Patient taking differently: Take 30 mg by mouth daily Two 30 mg tabs at bedtime with one 100 mg tab in the hs) 60 capsule 11     Polysaccharide Iron Complex (FERREX 150 PO)        pramipexole (MIRAPEX) 0.125 MG tablet Take 1 tablet (0.125 mg) by mouth At Bedtime 90 tablet 1     study - bisacodyl (IDS# 4779) 10 MG Suppository Place 10 mg rectally daily as needed for constipation       zonisamide (ZONEGRAN) 100 MG capsule 300 mg at bedtime. 90 capsule 11     PAST AEDs:  Unclear.    PAST MEDICAL HISTORY:   1.  Intractable epilepsy.   2.  Severe developmental delay.      PAST SURGICAL HISTORY:  Teeth extraction.      ALLERGIES:  Debrox.      FAMILY HISTORY:  Positive for bone cancer in father.  Brother has history of hypertension.      SOCIAL HISTORY:  He lives in a group home.  No education in the past.  He is nonverbal and has no interaction with surroundings.  No smoking, no drugs, no alcohol.  He has 2 sisters for his main social support.      REVIEW OF SYSTEMS:  Positive for \"strong urine smell\".  The rest of the 8-point review of systems is negative as per the caregiver.      PREVIOUS DIAGNOSTIC TESTING:     EEG (4/3/2017)   IMPRESSION:  This is a markedly abnormal EEG due to the presence of moderate to severe generalized slowing of the background activities.  There were also intermittent generalized and left temporal focal epileptiform activities during " this recording.  These findings are diagnostic of an epileptic disorder.  However, it is unclear at this time whether the patient has generalized epilepsy versus focal epilepsy.     Component      Latest Ref Rng & Units 7/17/2017   Phenytoin Level      10 - 20 mg/L 17.8   Phenytoin Free       1.26   Zonisamide Level Quant       12   Keppra (Levetiracetam) Level       56 (H)       PHYSICAL EXAMINATION:   There were no vitals taken for this visit.    GENERAL:  Normocephalic, atraumatic.   NECK:  Supple.   EXTREMITIES:  No edema.     NEUROLOGIC:  The patient is alert, nonverbal, no interaction, no eye contact.  No eye tracking. Pupils are equal, round and reactive to light.  Fundi exam unable to observe.  No facial weakness.   MOTOR EXAM:  Moving all extremities spontaneously.  Strength cannot be tested.  Deep tendon reflexes 2+ in both patellar, biceps and brachioradialis.   COORDINATION:  Sensory exam not able to perform.   GAIT:  Not tested.  The patient is in a wheelchair.      IMPRESSION:     1.  Intractable epilepsy. He is a 63-year-old male with a history of severe developmental delay and a longstanding intractable seizures.  He was previously seen by Dr. Kofi Xie at [facility] and Dr. Bliss in [facility] and he was being referred for further evaluation and management to optimize his epilepsy care.  The patient is accompanied by his sister and caregivers from the group home.  Patient was added with Zonegran since April 2017. He is now on Zonegran 300mg qhs, Dilantin 100-160, Keppra 1500 mg b.i.d. Gabapentin was stopped after the last visit. Since we started on Zonegran, his seizures improved. For the past 12 months, he had about 24 seizures, all his typical CPs that lasted for 30 seconds each except for one that lasted for 10 min.  He was sent to ED after a prolonged one.  He was having 6-7 seizures per months before Zonegran was started.    Sister complains of patient no sleeping well. She would like him  "to try Andre-Mag tabs. She think that this will help him with the \"restless leg syndrome\".     2.  Severe developmental delay, stable.      PLAN:   1.  Continue Dilantin 100-160, Keppra 1500 mg b.i.d.  Zonegran 300 mg qhs.  2.  Decrease Calcium/Vit D3 to once daily (600/800).  3.  If patient has increased seizures, options in the future include increasing Dilantin or Zonegran.  4.  Vit B12 level per sisters request (with the next blood draw)  5.  Return to clinic in 6 months. Check blood levels during next visit.      40 min was spent on the visit.  Over 50% of the time was spent on education, counseling about optimal seizure control and coordination of care.    "

## 2019-04-08 NOTE — LETTER
2019     RE: Charles Lugo  : 1955   MRN: [id number]      Dear Colleague,    Thank you for referring your patient, Charles Lugo, to the [facility] EPILEPSY CARE at [facility]. Please see a copy of my visit note below.    [facility]      Patient:  Charles Lugo  :  1955   Age:  63 year old   Today's Office Visit:  2019    Epilepsy Data:    Patient History  Primary Epileptologist/Provider: Monroe Escobedo M.D.  Patient Status: Chronic Intractable  Epilepsy Syndrome: Epilepsy unspecified  Epilepsy Syndrome Status: Undertermined - After Extensive Evaluation  Age of Onset: 4 years old  Other Relevant Dx/ Issues: severe developmental delay,      Tests/Surgery History  Last EE2017    Seizure Record  Current Visit Date: 19  Previous Visit Date: 18  Months since last visit: 7.95  Seizure Type 1: Unspecified Convulsion  Description of Sz Type 1: head will drop suddenly and then he may make some noises.  Then his arms will go up.  He will make fists with hands.  His eyes will roll back and sometimes he will have some leg kicking.  Then he will have convulsions with upper and lower extremities and his body.  Usually it lasts for 10 seconds to 45 seconds in duration  # of Type 1 Seizure since last visit: 3  Freq. Type 1 / Month: 0.38        Current Outpatient Medications   Medication Sig Dispense Refill     alendronate (FOSAMAX) 70 MG tablet Take 70 mg by mouth every 7 days       Ascorbic Acid (VITAMIN C PO) Take 500 mg by mouth daily       Calcium Carb-Cholecalciferol (CALCIUM-VITAMIN D) 600-400 MG-UNIT TABS        FOLIC ACID PO Take 1 mg by mouth daily       gentian violet 1 % solution Take 0.5 mLs by mouth as needed       levETIRAcetam (KEPPRA) 750 MG tablet Take 2 tablets (1,500 mg) by mouth 2 times daily 120 tablet 11     LEVOTHYROXINE SODIUM PO Take 88 mcg by mouth daily       Magnesium Hydroxide (MILK OF MAGNESIA PO)         PANTOPRAZOLE SODIUM PO Take 40 mg by mouth daily       phenylephrine-shark liver oil-mineral oil-petrolatum (PREPARATION H) 0.25-14-74.9 % rectal ointment Place rectally 2 times daily as needed for hemorrhoids       phenytoin (DILANTIN) 100 MG CR capsule Take 1 cap po BID ( together with 30 mg cap as directed) 60 capsule 11     phenytoin (DILANTIN) 30 MG CR capsule Take 2 capsules (60 mg) by mouth daily Two 30 mg tabs at bedtime with one 100 mg tab in the hs (Patient taking differently: Take 30 mg by mouth daily Two 30 mg tabs at bedtime with one 100 mg tab in the hs) 60 capsule 11     Polysaccharide Iron Complex (FERREX 150 PO)        pramipexole (MIRAPEX) 0.125 MG tablet Take 1 tablet (0.125 mg) by mouth At Bedtime 90 tablet 1     study - bisacodyl (IDS# 4779) 10 MG Suppository Place 10 mg rectally daily as needed for constipation       zonisamide (ZONEGRAN) 100 MG capsule 300 mg at bedtime. 90 capsule 11          CC: Seizures    HPI: Mr. Charles Lugo returns for follow up.  He is a 63-year-old male with a history of severe developmental delay and a longstanding intractable seizures.  He was previously seen by [place]. Kofi Xie at [place] and Dr. Bliss in [place] area and he was being referred for further evaluation and management to optimize his epilepsy care.  The patient is accompanied by his sister and caregivers from the group home.  Patient was added with Zonegran since April 2017. He is now on Zonegran 300mg qhs, Dilantin 100-160, Keppra 1500 mg b.i.d. Gabapentin was stopped after the last visit. Since we started on Zonegran, his seizures improved. For the past 4 months,, he had about 8 seizures, all his typical CPs that lasted for 30 seconds each except for one that lasted for 10 min.  He was sent to ED after the prolonged one.  He was having 6-7 seizures per months before Zonegran was started.    Sister complains of patient no sleeping well. She would like him to try Andre-Mag tabs. She think that  "this will help him with the \"restless leg syndrome\".      The patient has a history of severe developmental delay.  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  From the limited information we obtained so far he has never had good seizure control in the past.  He has a long history of intractable seizures since the age of 4 years old.  He is currently taking Dilantin, Keppra and gabapentin for his seizure control.  He is on Dilantin 100-160, Keppra 1500 mg b.i.d. and gabapentin 900 mg t.i.d.  His caregiver and sisters do not know what other anti-seizure medications he had in the past.      According to the caregiver, the patient only has 1 type of seizure.  During a seizure, his head will drop suddenly and then he may make some noises.  Then his arms will go up.  He will make fists with hands.  His eyes will roll back and sometimes he will have some leg kicking.  Then he will have convulsions with upper and lower extremities and his body.  Usually it lasts for 10 seconds to 45 seconds in duration.  At present time it happens once to twice a week.  The caregiver felt that his seizure frequency has never changed significantly for the past 5 years since she was his main caregiver.      Caregiver did not notice any significant side effects from the medications because the patient is nonverbal.  The patient did not have any MRI scan of his brain in the past and it is unclear when he had his last EEG done.      TRIGGERS FOR SEIZURES:  Unclear.      RISK FACTORS FOR SEIZURES:  He has a history of trauma with severe developmental delay after birth.  No history of stroke.  No history of brain tumor.  No history of CNS infection.  No history of febrile convulsions.      Current Outpatient Medications   Medication Sig Dispense Refill     alendronate (FOSAMAX) 70 MG tablet Take 70 mg by mouth every 7 days       Ascorbic Acid (VITAMIN C PO) " "Take 500 mg by mouth daily       Calcium Carb-Cholecalciferol (CALCIUM-VITAMIN D) 600-400 MG-UNIT TABS        FOLIC ACID PO Take 1 mg by mouth daily       gentian violet 1 % solution Take 0.5 mLs by mouth as needed       levETIRAcetam (KEPPRA) 750 MG tablet Take 2 tablets (1,500 mg) by mouth 2 times daily 120 tablet 11     LEVOTHYROXINE SODIUM PO Take 88 mcg by mouth daily       Magnesium Hydroxide (MILK OF MAGNESIA PO)        PANTOPRAZOLE SODIUM PO Take 40 mg by mouth daily       phenylephrine-shark liver oil-mineral oil-petrolatum (PREPARATION H) 0.25-14-74.9 % rectal ointment Place rectally 2 times daily as needed for hemorrhoids       phenytoin (DILANTIN) 100 MG CR capsule Take 1 cap po BID ( together with 30 mg cap as directed) 60 capsule 11     phenytoin (DILANTIN) 30 MG CR capsule Take 2 capsules (60 mg) by mouth daily Two 30 mg tabs at bedtime with one 100 mg tab in the hs (Patient taking differently: Take 30 mg by mouth daily Two 30 mg tabs at bedtime with one 100 mg tab in the hs) 60 capsule 11     Polysaccharide Iron Complex (FERREX 150 PO)        pramipexole (MIRAPEX) 0.125 MG tablet Take 1 tablet (0.125 mg) by mouth At Bedtime 90 tablet 1     study - bisacodyl (IDS# 4779) 10 MG Suppository Place 10 mg rectally daily as needed for constipation       zonisamide (ZONEGRAN) 100 MG capsule 300 mg at bedtime. 90 capsule 11     PAST AEDs:  Unclear.    PAST MEDICAL HISTORY:   1.  Intractable epilepsy.   2.  Severe developmental delay.      PAST SURGICAL HISTORY:  Teeth extraction.      ALLERGIES:  Debrox.      FAMILY HISTORY:  Positive for bone cancer in father.  Brother has history of hypertension.      SOCIAL HISTORY:  He lives in a group home.  No education in the past.  He is nonverbal and has no interaction with surroundings.  No smoking, no drugs, no alcohol.  He has 2 sisters for his main social support.      REVIEW OF SYSTEMS:  Positive for \"strong urine smell\".  The rest of the 8-point review of " systems is negative as per the caregiver.      PREVIOUS DIAGNOSTIC TESTING:     EEG (4/3/2017)   IMPRESSION:  This is a markedly abnormal EEG due to the presence of moderate to severe generalized slowing of the background activities.  There were also intermittent generalized and left temporal focal epileptiform activities during this recording.  These findings are diagnostic of an epileptic disorder.  However, it is unclear at this time whether the patient has generalized epilepsy versus focal epilepsy.     Component      Latest Ref Rng & Units 7/17/2017   Phenytoin Level      10 - 20 mg/L 17.8   Phenytoin Free       1.26   Zonisamide Level Quant       12   Keppra (Levetiracetam) Level       56 (H)       PHYSICAL EXAMINATION:   There were no vitals taken for this visit.    GENERAL:  Normocephalic, atraumatic.   NECK:  Supple.   EXTREMITIES:  No edema.     NEUROLOGIC:  The patient is alert, nonverbal, no interaction, no eye contact.  No eye tracking. Pupils are equal, round and reactive to light.  Fundi exam unable to observe.  No facial weakness.   MOTOR EXAM:  Moving all extremities spontaneously.  Strength cannot be tested.  Deep tendon reflexes 2+ in both patellar, biceps and brachioradialis.   COORDINATION:  Sensory exam not able to perform.   GAIT:  Not tested.  The patient is in a wheelchair.      IMPRESSION:     1.  Intractable epilepsy. He is a 63-year-old male with a history of severe developmental delay and a longstanding intractable seizures.  He was previously seen by Dr. Kofi Xie at [place] and Dr. Bliss in [facility] and he was being referred for further evaluation and management to optimize his epilepsy care.  The patient is accompanied by his sister and caregivers from the group home.  Patient was added with Zonegran since April 2017. He is now on Zonegran 300mg qhs, Dilantin 100-160, Keppra 1500 mg b.i.d. Gabapentin was tapered down to 300 mg t.i.d. Since we started on Zonegran, his seizures  "improved. For the past 4 months,, he had about 8 seizures, all his typical CPs that lasted for 30 seconds each except for one that lasted for 10 min.  He was sent to ED after the prolonged one.  He was having 6-7 seizures per months before Zonegran was started.    Sister complains of patient no sleeping well. She would like him to try Andre-Mag tabs. She think that this will help him with the \"restless leg syndrome\".     2.  Severe developmental delay, stable.      PLAN:   1.  Continue Dilantin 100-160, Keppra 1500 mg b.i.d.  Zonegran 300 mg qhs.  2.  Decrease Calcium/Vit D3 to once daily (600/800).  3.  If patient has increased seizures, options in the future include increasing Dilantin or Zonegran.  4. Vit B12 level per sisters request.  5.  Return to clinic in 6 months. Check blood levels during next visit.    40 min was spent on the visit.  Over 50% of the time was spent on education, counseling about optimal seizure control and coordination of care.    Again, thank you for allowing me to participate in the care of your patient.      Sincerely,    Monroe Escobedo MD      "

## 2019-04-08 NOTE — PATIENT INSTRUCTIONS
Times of Days  am pm noon       Medication Tablet Size Number of Tablets/Capsules Total Daily Dosage    Keppra  750  2  2        3000 mg    Dilantin  100  1 1        200 mg    Dilantin  30   2        60 mg    Zonegran  100   3        300 mg                                                                       Carry this with you at all times.  CONTINUE TAKING YOUR OTHER MEDICATIONS AS PREVIOUSLY DIRECTED.      * * *Do not store medications in the bathroom.  Keep medications away from children!* * *

## 2019-04-17 DIAGNOSIS — G40.909 SEIZURE SYNDROME (H): ICD-10-CM

## 2019-04-17 DIAGNOSIS — G40.909 SEIZURE DISORDER (H): ICD-10-CM

## 2019-04-19 ENCOUNTER — TELEPHONE (OUTPATIENT)
Dept: NEUROLOGY | Facility: CLINIC | Age: 64
End: 2019-04-19

## 2019-04-19 NOTE — TELEPHONE ENCOUNTER
Ashish Hernandez MA [facility]             Caller: Cyn     Relationship to Patient: sister     Call Back Number: [phone]     Reason for Call: Wants to know if the pt needs blood work     Consent to communicate on file dated 3/30/2017    PLAN:    Attempted to reach x 2.  Left message stating Charles is scheduled to have labs drawn in October 2019.   Encouraged to call back if additional questions or concerns.

## 2019-04-22 ENCOUNTER — TELEPHONE (OUTPATIENT)
Dept: NEUROLOGY | Facility: CLINIC | Age: 64
End: 2019-04-22

## 2019-04-22 RX ORDER — ZONISAMIDE 100 MG/1
CAPSULE ORAL
Qty: 90 CAPSULE | Refills: 11 | Status: SHIPPED | OUTPATIENT
Start: 2019-04-22 | End: 2020-04-09

## 2019-04-22 RX ORDER — PHENYTOIN SODIUM 100 MG/1
CAPSULE, EXTENDED RELEASE ORAL
Qty: 60 CAPSULE | Refills: 11 | Status: SHIPPED | OUTPATIENT
Start: 2019-04-22 | End: 2020-04-09

## 2019-04-22 RX ORDER — LEVETIRACETAM 750 MG/1
1500 TABLET ORAL 2 TIMES DAILY
Qty: 120 TABLET | Refills: 11 | Status: SHIPPED | OUTPATIENT
Start: 2019-04-22 | End: 2020-04-09

## 2019-04-22 NOTE — TELEPHONE ENCOUNTER
Follow up phone call made to Cyn Bone (brother of Charles Lugo). Consent to communicate on file dated 3/20/2017.        Cyn is agreeable to complete the Patient Request to Amend Protected Health Information form.  Form will be mailed to her home address per her request.

## 2019-04-24 ENCOUNTER — TELEPHONE (OUTPATIENT)
Dept: NEUROLOGY | Facility: CLINIC | Age: 64
End: 2019-04-24

## 2019-04-24 NOTE — TELEPHONE ENCOUNTER
"Melissa Dias Heather, RN             Caller: Cyn   -   Relationship to Patient: sister     Call Back Number: [phone]     Reason for Call: Would like to discuss the changes in the pt's medical record. She states that the [facility] will send a new med list     Cyn calls today requesting to amend the medical records on behalf of Charles.  Cyn reports that some of the data provided is inaccurate.  Writer mailed Cyn \"Patient Request to Amend Protected Health Information\" form for completion.        "

## 2019-06-20 ENCOUNTER — TELEPHONE (OUTPATIENT)
Dept: NEUROLOGY | Facility: CLINIC | Age: 64
End: 2019-06-20

## 2019-08-12 DIAGNOSIS — G25.81 RESTLESS LEGS SYNDROME: Primary | ICD-10-CM

## 2019-08-13 RX ORDER — PRAMIPEXOLE DIHYDROCHLORIDE 0.12 MG/1
0.12 TABLET ORAL AT BEDTIME
Qty: 90 TABLET | Refills: 1 | Status: SHIPPED | OUTPATIENT
Start: 2019-08-13 | End: 2020-03-16

## 2019-08-21 ENCOUNTER — TRANSFERRED RECORDS (OUTPATIENT)
Dept: HEALTH INFORMATION MANAGEMENT | Facility: CLINIC | Age: 64
End: 2019-08-21

## 2019-08-26 ENCOUNTER — TELEPHONE (OUTPATIENT)
Dept: NEUROLOGY | Facility: CLINIC | Age: 64
End: 2019-08-26

## 2019-08-29 ENCOUNTER — TRANSFERRED RECORDS (OUTPATIENT)
Dept: HEALTH INFORMATION MANAGEMENT | Facility: CLINIC | Age: 64
End: 2019-08-29

## 2019-09-13 DIAGNOSIS — G40.909 RECURRENT SEIZURES (H): Primary | ICD-10-CM

## 2019-09-20 NOTE — TELEPHONE ENCOUNTER
Protocol was returned to our office with a request to start diazepam as rescue med and to extend calling for EMS to 10 minutes.     Will send request to Dr. Escobedo for advisement.

## 2019-09-23 RX ORDER — DIAZEPAM ORAL SOLUTION (CONCENTRATE) 5 MG/ML
SOLUTION ORAL
Qty: 30 ML | Refills: 1 | Status: SHIPPED | OUTPATIENT
Start: 2019-09-23 | End: 2020-01-22

## 2019-09-23 NOTE — TELEPHONE ENCOUNTER
Monroe Escobedo MD Vassar, Allison, RN             Ok, we can change that.   Thank you!   Monroe

## 2019-10-03 ENCOUNTER — TRANSFERRED RECORDS (OUTPATIENT)
Dept: HEALTH INFORMATION MANAGEMENT | Facility: CLINIC | Age: 64
End: 2019-10-03

## 2020-01-21 DIAGNOSIS — G40.909 RECURRENT SEIZURES (H): ICD-10-CM

## 2020-01-21 NOTE — TELEPHONE ENCOUNTER
diazepam (DIAZEPAM INTENSOL) 5 MG/ML (HIGH CONC) solution      Last Written Prescription Date:  9-23-19  Last Fill Quantity: 30 ml,   # refills: 1  Last Office Visit : 4-8-19  Future Office visit:  none  Routing refill request to provider for review/approval because:  Controlled medication refill request.      Kathleen M Doege RN

## 2020-01-22 RX ORDER — DIAZEPAM ORAL SOLUTION (CONCENTRATE) 5 MG/ML
SOLUTION ORAL
Qty: 30 ML | Refills: 4 | Status: SHIPPED | OUTPATIENT
Start: 2020-01-22 | End: 2020-10-01

## 2020-03-09 DIAGNOSIS — G25.81 RESTLESS LEGS SYNDROME: ICD-10-CM

## 2020-03-16 RX ORDER — PRAMIPEXOLE DIHYDROCHLORIDE 0.12 MG/1
0.12 TABLET ORAL AT BEDTIME
Qty: 90 TABLET | Refills: 0 | Status: SHIPPED | OUTPATIENT
Start: 2020-03-16 | End: 2020-06-25

## 2020-03-16 NOTE — TELEPHONE ENCOUNTER
Pramipexole Dihydrochloride 0.125 MG Tablet       Last Written Prescription Date:  8/3/19  Last Fill Quantity: 90,   # refills: 1  Last Office Visit : 4/8/19 with recommended 6 month follow up  Future Office visit:  4/8/20    Routing refill request to provider for review/approval because:  Protocol shows BP annually  Last BP:    BP Readings from Last 3 Encounters:   08/09/18 132/73   05/14/18 (!) 76/53   10/30/17 103/61     Nothing in care everywhere or media for more recent BP.

## 2020-04-09 ENCOUNTER — VIRTUAL VISIT (OUTPATIENT)
Dept: NEUROLOGY | Facility: CLINIC | Age: 65
End: 2020-04-09
Payer: MEDICARE

## 2020-04-09 DIAGNOSIS — G40.909 SEIZURE DISORDER (H): ICD-10-CM

## 2020-04-09 DIAGNOSIS — G40.909 SEIZURE SYNDROME (H): ICD-10-CM

## 2020-04-09 RX ORDER — PHENYTOIN SODIUM 100 MG/1
CAPSULE, EXTENDED RELEASE ORAL
Qty: 60 CAPSULE | Refills: 11 | Status: SHIPPED | OUTPATIENT
Start: 2020-04-09 | End: 2021-01-01

## 2020-04-09 RX ORDER — LEVETIRACETAM 750 MG/1
1500 TABLET ORAL 2 TIMES DAILY
Qty: 120 TABLET | Refills: 11 | Status: SHIPPED | OUTPATIENT
Start: 2020-04-09 | End: 2020-07-23

## 2020-04-09 RX ORDER — ZONISAMIDE 100 MG/1
CAPSULE ORAL
Qty: 90 CAPSULE | Refills: 11 | Status: SHIPPED | OUTPATIENT
Start: 2020-04-09 | End: 2021-01-01

## 2020-04-09 NOTE — LETTER
"2020    RE: Charles Lugo  [address] [place] [address]     Charles Lugo is a 64 year old male who is being evaluated via a billable telephone visit.      The patient has been notified of following:     \"This telephone visit will be conducted via a call between you and your physician/provider. We have found that certain health care needs can be provided without the need for a physical exam.  This service lets us provide the care you need with a short phone conversation.  If a prescription is necessary we can send it directly to your pharmacy.  If lab work is needed we can place an order for that and you can then stop by our lab to have the test done at a later time.    Telephone visits are billed at different rates depending on your insurance coverage. During this emergency period, for some insurers they may be billed the same as an in-person visit.  Please reach out to your insurance provider with any questions.    If during the course of the call the physician/provider feels a telephone visit is not appropriate, you will not be charged for this service.\"    Patient has given verbal consent for Telephone visit?  Yes    How would you like to obtain your AVS? Mail a copy    Charles Lugo complains of  No chief complaint on file.      I have reviewed and updated the patient's Past Medical History, Social History, Family History and Medication List.    Krishna Ovalle, EMILIANO    ALLERGIES  P/MINCEP Epilepsy Care Progress Note      Patient:  Charles Lugo  :  1955   Age:  64 year old   Today's Office Visit:  2020    Epilepsy Data:    Patient History  Primary Epileptologist/Provider: Monroe Escobedo M.D.  Patient Status: Chronic Intractable  Epilepsy Syndrome: Epilepsy unspecified  Epilepsy Syndrome Status: Undertermined - After Extensive Evaluation  Age of Onset: 4 years old  Other Relevant Dx/ Issues: severe developmental delay,      Tests/Surgery History  Last EE2017    Seizure " Record  Current Visit Date: 04/09/20  Previous Visit Date: 04/08/19  Months since last visit: 12.06  Seizure Type 1: Unspecified Convulsion  Description of Sz Type 1: head will drop suddenly and then he may make some noises.  Then his arms will go up.  He will make fists with hands.  His eyes will roll back and sometimes he will have some leg kicking.  Then he will have convulsions with upper and lower extremities and his body.  Usually it lasts for 10 seconds to 45 seconds in duration  # of Type 1 Seizure since last visit: 12  Freq. Type 1 / Month: 1    History of Present Illness:     EPILESY ATTENDING PHONE NOTEP  Clinic visit was changed to phone visit because in person visit was deemed high risk given current prevalence of COVID 19 in the community. Further, conversion to phone visit was pursued to reduce in person interactions during trip to physician, in waiting rooms, etc that could promote COVID 19 transmission.    First time I have interacted with Mr Lugo; he was previously followed by Dr Escobedo.  Continues with seizures but reduced compared to last visit.  Seizure characteristics: stiffens up, hands are out, looks up in air, mouth open. Mostly stiffening, little jerking. Maximum one and one half minutes. Tired after seizrue over, may holler. No sounds during the seizure. One cluster of three seizures in January, 15 sec, 40 sec, 60 seconds. These occurred starting 8:10, 8:25, 7:53. Given diazepam at the time.    Diazepam protocol currently only if GTC > 5 minutes.    Staff is not clear whether RLS preceded starting ZNS or not. Does not move legs excessively when trying to go to sleep.       Current Outpatient Medications   Medication Sig Dispense Refill     alendronate (FOSAMAX) 70 MG tablet Take 70 mg by mouth every 7 days       Ascorbic Acid (VITAMIN C PO) Take 500 mg by mouth daily       Calcium Carb-Cholecalciferol (CALCIUM-VITAMIN D) 600-400 MG-UNIT TABS        diazepam (VALIUM) 5 MG/ML (HIGH CONC)  solution GIVE 1ML (5MG) BY MOUTH FOR GTC >5 MINUTES. IF CONTINUES FOR AN ADDITIONAL 5 MINUTES, CALL 911. MAX OF 2ML (10MG-2 DOSES) IN 24 HOURS. MONITOR BREATHING. *NO REFILLS REMAINING, FAXING FOR REFILLS* 30 mL 4     FOLIC ACID PO Take 1 mg by mouth daily       gentian violet 1 % solution Take 0.5 mLs by mouth as needed       levETIRAcetam (KEPPRA) 750 MG tablet Take 2 tablets (1,500 mg) by mouth 2 times daily 120 tablet 11     LEVOTHYROXINE SODIUM PO Take 88 mcg by mouth daily       Magnesium Hydroxide (MILK OF MAGNESIA PO)        PANTOPRAZOLE SODIUM PO Take 40 mg by mouth daily       phenylephrine-shark liver oil-mineral oil-petrolatum (PREPARATION H) 0.25-14-74.9 % rectal ointment Place rectally 2 times daily as needed for hemorrhoids       phenytoin (DILANTIN) 100 MG capsule Take 1 cap po BID ( together with 30 mg cap as directed) 60 capsule 11     phenytoin (DILANTIN) 30 MG capsule Take 2 capsules (60 mg) by mouth daily Two 30 mg tabs at bedtime with one 100 mg tab in the hs 60 capsule 11     Polysaccharide Iron Complex (FERREX 150 PO)        pramipexole (MIRAPEX) 0.125 MG tablet Take 1 tablet (0.125 mg) by mouth At Bedtime 90 tablet 0     study - bisacodyl (IDS# 4779) 10 MG Suppository Place 10 mg rectally daily as needed for constipation       zonisamide (ZONEGRAN) 100 MG capsule 300 mg at bedtime. 90 capsule 11        Medication Notes:    Taking generic , dilantin 30 mg. Taking pramipexole for RLS.  AED Medication Compliance:  compliant all of the time  Using a pill box:  Medications are administered to patient.    Review of Systems:  Patient unable to provide, information obtained from caregivers. No evidence of dysphagia, cough, wheezing, hemoptysis, chest pain, leg swelling, significant weight change, fevers, nausea, vomiting, change in bowel habits, blood per rectum, frequency, kidney stones.  Have you experienced a traumatic fall since your last visit: NO  Are these falls related to your  seizures: Not Applicable,  Significant weight loss; see below.    Other Issues:    No fractures. Significant weight loss since last seen. Seven pounds since November. Currently weighs 100 pounds. Staff and family cannot really provide information regarding course of weight loss. Primary care has evaluated, suspects bowel carcinoma but have decided not to pursue testing because family reportedly does not want to pursue surgical or other aggressive treatments. Discussed with sister who is his guardian; he is currently DNR/DNI. They had concerns about what to do if seizures are a problem during comfort care and he cannot tolerate PO.  Is patient safe to drive:  NA    Exam:    There were no vitals taken for this visit.     Wt Readings from Last 5 Encounters:   07/17/17 134 lb (60.8 kg)   04/03/17 139 lb 8 oz (63.3 kg)     One hundred pounds last week per staff report.    IMPRESSION  1) Probable symptomatic generalized epilepsy with tonic and tonic clonic seizures.  2) Continues with significant improvement following administration of zonisamide. Seizure frequency continues better than last year; down to one seizure per month on average; presented with average 6 seizures per month.  3) Signficant weight loss. Etiology unclear. One concern is that signficant weight loss has significantly increased AED levels, especially phenytoin and levetiracetam. Other is that zonisamide may be responsible for weight loss. Above info indicates weight was approx 140# prior to initiation of ZNS.      DISCUSSION  Above discussed. Family had concerns about what to do is seizures got out of control during comfort care when parenteral treatments are typically not prescribed. We spent a good deal of time discussing this.    PLAN:  1) Refilled Rx.  2) Staff will try to get labs done late last year to us to see if AED levels done. They will fax results. Asked staff to call nurse line for follow up after levels faxed. If levels not done these  will need to be redrawn.  3) Follow up in 2 months.  4) Modified rescue protocol so that can use diazepam if more othan two seizures in less than 30 minutes.  5) Discussed options regarding control of seizures during comfort care. Unfortunately these are largely limited to IM fosphenytoin and benzodiazepines in various parenteral forms. Benzos can hasten respiratory compromise. Reassured family that seizures do not always worsen in terminal situations and that long half life of ZNS may keep situation from deteriorating significantly.    Total phone time 38 minutes.    Monty Merchant MD

## 2020-04-09 NOTE — PROGRESS NOTES
"Charles Lugo is a 64 year old male who is being evaluated via a billable telephone visit.      The patient has been notified of following:     \"This telephone visit will be conducted via a call between you and your physician/provider. We have found that certain health care needs can be provided without the need for a physical exam.  This service lets us provide the care you need with a short phone conversation.  If a prescription is necessary we can send it directly to your pharmacy.  If lab work is needed we can place an order for that and you can then stop by our lab to have the test done at a later time.    Telephone visits are billed at different rates depending on your insurance coverage. During this emergency period, for some insurers they may be billed the same as an in-person visit.  Please reach out to your insurance provider with any questions.    If during the course of the call the physician/provider feels a telephone visit is not appropriate, you will not be charged for this service.\"    Patient has given verbal consent for Telephone visit?  Yes    How would you like to obtain your AVS? Mail a copy    Charles Lugo complains of  No chief complaint on file.      I have reviewed and updated the patient's Past Medical History, Social History, Family History and Medication List.    Krishna Ovalle, EMILIANO    ALLERGIES  [facility] Progress Note      Patient:  Charles Lugo  :  1955   Age:  64 year old   Today's Office Visit:  2020    Epilepsy Data:    Patient History  Primary Epileptologist/Provider: Monroe Escobedo M.D.  Patient Status: Chronic Intractable  Epilepsy Syndrome: Epilepsy unspecified  Epilepsy Syndrome Status: Undertermined - After Extensive Evaluation  Age of Onset: 4 years old  Other Relevant Dx/ Issues: severe developmental delay,      Tests/Surgery History  Last EE2017    Seizure Record  Current Visit Date: 20  Previous Visit Date: 19  Months since " last visit: 12.06  Seizure Type 1: Unspecified Convulsion  Description of Sz Type 1: head will drop suddenly and then he may make some noises.  Then his arms will go up.  He will make fists with hands.  His eyes will roll back and sometimes he will have some leg kicking.  Then he will have convulsions with upper and lower extremities and his body.  Usually it lasts for 10 seconds to 45 seconds in duration  # of Type 1 Seizure since last visit: 12  Freq. Type 1 / Month: 1    History of Present Illness:     EPILESY ATTENDING PHONE NOTEP  Clinic visit was changed to phone visit because in person visit was deemed high risk given current prevalence of COVID 19 in the community. Further, conversion to phone visit was pursued to reduce in person interactions during trip to physician, in waiting rooms, etc that could promote COVID 19 transmission.    First time I have interacted with Mr Lugo; he was previously followed by Dr Escobedo.  Continues with seizures but reduced compared to last visit.  Seizure characteristics: stiffens up, hands are out, looks up in air, mouth open. Mostly stiffening, little jerking. Maximum one and one half minutes. Tired after seizrue over, may holler. No sounds during the seizure. One cluster of three seizures in January, 15 sec, 40 sec, 60 seconds. These occurred starting 8:10, 8:25, 7:53. Given diazepam at the time.    Diazepam protocol currently only if GTC > 5 minutes.    Staff is not clear whether RLS preceded starting ZNS or not. Does not move legs excessively when trying to go to sleep.       Current Outpatient Medications   Medication Sig Dispense Refill     alendronate (FOSAMAX) 70 MG tablet Take 70 mg by mouth every 7 days       Ascorbic Acid (VITAMIN C PO) Take 500 mg by mouth daily       Calcium Carb-Cholecalciferol (CALCIUM-VITAMIN D) 600-400 MG-UNIT TABS        diazepam (VALIUM) 5 MG/ML (HIGH CONC) solution GIVE 1ML (5MG) BY MOUTH FOR GTC >5 MINUTES. IF CONTINUES FOR AN ADDITIONAL 5  MINUTES, CALL 911. MAX OF 2ML (10MG-2 DOSES) IN 24 HOURS. MONITOR BREATHING. *NO REFILLS REMAINING, FAXING FOR REFILLS* 30 mL 4     FOLIC ACID PO Take 1 mg by mouth daily       gentian violet 1 % solution Take 0.5 mLs by mouth as needed       levETIRAcetam (KEPPRA) 750 MG tablet Take 2 tablets (1,500 mg) by mouth 2 times daily 120 tablet 11     LEVOTHYROXINE SODIUM PO Take 88 mcg by mouth daily       Magnesium Hydroxide (MILK OF MAGNESIA PO)        PANTOPRAZOLE SODIUM PO Take 40 mg by mouth daily       phenylephrine-shark liver oil-mineral oil-petrolatum (PREPARATION H) 0.25-14-74.9 % rectal ointment Place rectally 2 times daily as needed for hemorrhoids       phenytoin (DILANTIN) 100 MG capsule Take 1 cap po BID ( together with 30 mg cap as directed) 60 capsule 11     phenytoin (DILANTIN) 30 MG capsule Take 2 capsules (60 mg) by mouth daily Two 30 mg tabs at bedtime with one 100 mg tab in the hs 60 capsule 11     Polysaccharide Iron Complex (FERREX 150 PO)        pramipexole (MIRAPEX) 0.125 MG tablet Take 1 tablet (0.125 mg) by mouth At Bedtime 90 tablet 0     study - bisacodyl (IDS# 4779) 10 MG Suppository Place 10 mg rectally daily as needed for constipation       zonisamide (ZONEGRAN) 100 MG capsule 300 mg at bedtime. 90 capsule 11        Medication Notes:    Taking generic , dilantin 30 mg. Taking pramipexole for RLS.  AED Medication Compliance:  compliant all of the time  Using a pill box:  Medications are administered to patient.    Review of Systems:  Patient unable to provide, information obtained from caregivers. No evidence of dysphagia, cough, wheezing, hemoptysis, chest pain, leg swelling, significant weight change, fevers, nausea, vomiting, change in bowel habits, blood per rectum, frequency, kidney stones.  Have you experienced a traumatic fall since your last visit: NO  Are these falls related to your seizures: Not Applicable,  Significant weight loss; see below.    Other Issues:    No  fractures. Significant weight loss since last seen. Seven pounds since November. Currently weighs 100 pounds. Staff and family cannot really provide information regarding course of weight loss. Primary care has evaluated, suspects bowel carcinoma but have decided not to pursue testing because family reportedly does not want to pursue surgical or other aggressive treatments. Discussed with sister who is his guardian; he is currently DNR/DNI. They had concerns about what to do if seizures are a problem during comfort care and he cannot tolerate PO.  Is patient safe to drive:  NA    Exam:    There were no vitals taken for this visit.     Wt Readings from Last 5 Encounters:   07/17/17 134 lb (60.8 kg)   04/03/17 139 lb 8 oz (63.3 kg)     One hundred pounds last week per staff report.    IMPRESSION  1) Probable symptomatic generalized epilepsy with tonic and tonic clonic seizures.  2) Continues with significant improvement following administration of zonisamide. Seizure frequency continues better than last year; down to one seizure per month on average; presented with average 6 seizures per month.  3) Signficant weight loss. Etiology unclear. One concern is that signficant weight loss has significantly increased AED levels, especially phenytoin and levetiracetam. Other is that zonisamide may be responsible for weight loss. Above info indicates weight was approx 140# prior to initiation of ZNS.    DISCUSSION  Above discussed. Family had concerns about what to do is seizures got out of control during comfort care when parenteral treatments are typically not prescribed. We spent a good deal of time discussing this.    PLAN:  1) Refilled Rx.  2) Staff will try to get labs done late last year to us to see if AED levels done. They will fax results. Asked staff to call nurse line for follow up after levels faxed. If levels not done these will need to be redrawn.  3) Follow up in 2 months.  4) Modified rescue protocol so that  can use diazepam if more othan two seizures in less than 30 minutes.  5) Discussed options regarding control of seizures during comfort care. Unfortunately these are largely limited to IM fosphenytoin and benzodiazepines in various parenteral forms. Benzos can hasten respiratory compromise. Reassured family that seizures do not always worsen in terminal situations and that long half life of ZNS may keep situation from deteriorating significantly.    Total phone time 38 minutes.    Monty Merchant MD

## 2020-05-04 ENCOUNTER — TELEPHONE (OUTPATIENT)
Dept: NEUROLOGY | Facility: CLINIC | Age: 65
End: 2020-05-04

## 2020-05-04 DIAGNOSIS — G40.909 SEIZURE DISORDER (H): Primary | ICD-10-CM

## 2020-05-04 NOTE — TELEPHONE ENCOUNTER
What is the concern that needs to be addressed by a nurse? Harika from patient group home call and would like  Someone to reach out to patient family regarding recent lab that was re-faxed today and it's in the fax from today.    May a detailed message be left on voicemail?     Date of last office visit: 04/09/2020    Message routed to: [facility] RN POOL

## 2020-05-06 NOTE — TELEPHONE ENCOUNTER
"Results note from physicians at [facility]  The provider notes the \"levetiracetam level is too high\" at 70.5    Blood draw was from 8/21/2019, 10.14am    MD note from  [facility] 4/9/2020  \"2) Staff will try to get labs done late last year to us to see if AED levels done. They will fax results. Asked staff to call nurse line for follow up after levels faxed. If levels not done these will need to be redrawn.\"    The concern is that with significant weight loss following initiation of zonisamide, (from 140# to 100#) blood levels may have increased.    If this is the most recent blood levels, will need to arrange for current levels (per MD note) as the levels are now 7 months old    Call placed to facility, message left with call back number    "

## 2020-05-07 NOTE — TELEPHONE ENCOUNTER
Call returned by Harika.  The levels from August 2019 are the most recent blood levels.    Labs would be drawn through [facility].  Fax [phone]    Orders placed as per MD note.  Chart routed to  staff to fax.

## 2020-05-11 ENCOUNTER — TRANSFERRED RECORDS (OUTPATIENT)
Dept: HEALTH INFORMATION MANAGEMENT | Facility: CLINIC | Age: 65
End: 2020-05-11

## 2020-06-22 ENCOUNTER — TELEPHONE (OUTPATIENT)
Dept: NEUROLOGY | Facility: CLINIC | Age: 65
End: 2020-06-22

## 2020-06-22 DIAGNOSIS — G25.81 RESTLESS LEGS SYNDROME: ICD-10-CM

## 2020-06-22 NOTE — TELEPHONE ENCOUNTER
Lab results from [facility]:    5/11/2020 11:02AM:  LEV 65.6  PHT 14.9/1.5  ZNS 4.7    Routing to MD for review.

## 2020-06-22 NOTE — TELEPHONE ENCOUNTER
What is the concern that needs to be addressed by a nurse? Lab work was done and they are wondering changes need to be done to patient meds.    May a detailed message be left on voicemail? Yes     Date of last office visit: 04/09/2020    Message routed to: [facility] RN POOL

## 2020-06-25 RX ORDER — PRAMIPEXOLE DIHYDROCHLORIDE 0.12 MG/1
TABLET ORAL
Qty: 31 TABLET | Refills: 10 | Status: SHIPPED | OUTPATIENT
Start: 2020-06-25 | End: 2021-01-01

## 2020-06-25 NOTE — TELEPHONE ENCOUNTER
Pramipexole Dihydrochloride 0.125 MG Tablet   Last Written Prescription Date:  3/16/20  Last Fill Quantity: 90,   # refills: 0  Last Office Visit : 4/9/20  ) Refilled Rx.  2) Staff will try to get labs done late last year to us to see if AED levels done. They will fax results. Asked staff to call nurse line for follow up after levels faxed. If levels not done these will need to be redrawn.  Future Office visit:  7/23/20     Outside labs dated 5/11/20 are scanned into system. Levtiracetam, phenytoin,zonisamide levels.    *BP reading due( last 8/2018)- last visit virtual. [facility]/ severe developmental delay,

## 2020-06-26 NOTE — TELEPHONE ENCOUNTER
AED levels reviewed By Dr. Merchant. No medication dosing changes indicated. Follow up as scheduled in July. This was communicated to the patient's caregivers at [facility].

## 2020-07-23 ENCOUNTER — VIRTUAL VISIT (OUTPATIENT)
Dept: NEUROLOGY | Facility: CLINIC | Age: 65
End: 2020-07-23
Payer: MEDICARE

## 2020-07-23 DIAGNOSIS — G40.909 SEIZURE SYNDROME (H): ICD-10-CM

## 2020-07-23 RX ORDER — LEVETIRACETAM 750 MG/1
TABLET ORAL
Qty: 93 TABLET | Refills: 11 | Status: SHIPPED | OUTPATIENT
Start: 2020-07-23 | End: 2020-11-04

## 2020-07-23 NOTE — PROGRESS NOTES
"Charles Lugo is a 65 year old male who is being evaluated via a billable video visit.      The patient has been notified of following:     \"This video visit will be conducted via a call between you and your physician/provider. We have found that certain health care needs can be provided without the need for an in-person physical exam.  This service lets us provide the care you need with a video conversation.  If a prescription is necessary we can send it directly to your pharmacy.  If lab work is needed we can place an order for that and you can then stop by our lab to have the test done at a later time.    Video visits are billed at different rates depending on your insurance coverage.  Please reach out to your insurance provider with any questions.    If during the course of the call the physician/provider feels a video visit is not appropriate, you will not be charged for this service.\"    Patient has given verbal consent for Video visit? Yes  How would you like to obtain your AVS? Mail a copy  If you are dropped from the video visit, the video invite should be resent to: Send to e-mail at: [email or web address]  Will anyone else be joining your video visit? Yes: staff and guardian. How would they like to receive their invitation? Send to e-mail at: [email or web address]        Video-Visit Details    VIDEO VISIT PARAMETERS  Video visit application used: [employer]  Patient or representative agreed to visit? Yes  Patient initiated visit: Yes  Patient location during visit: group home  Physician location during visit: [facility] clinic  Time of visit start: 12 noon  Time of visit finish: 12:26 PM  Total time of visit: 26 minutes     Monty Merchant MD        [facility]      Patient:  Charles Lugo  :  1955   Age:  65 year old   Today's Office Visit:  2020    Epilepsy Data:    Patient History  Primary Epileptologist/Provider: Monroe Escobedo M.D.  Patient Status: Chronic " Intractable  Epilepsy Syndrome: Epilepsy unspecified  Epilepsy Syndrome Status: Undertermined - After Extensive Evaluation  Age of Onset: 4 years old  Other Relevant Dx/ Issues: severe developmental delay,      Tests/Surgery History  Last EE2017    Seizure Record  Current Visit Date: 20  Previous Visit Date: 20  Months since last visit: 3.45  Seizure Type 1: Unspecified Convulsion  Description of Sz Type 1: head will drop suddenly and then he may make some noises.  Then his arms will go up.  He will make fists with hands.  His eyes will roll back and sometimes he will have some leg kicking.  Then he will have convulsions with upper and lower extremities and his body.  Usually it lasts for 10 seconds to 45 seconds in duration  # of Type 1 Seizure since last visit: 2  Freq. Type 1 / Month: 0.58    History of Present Illness:     Only two seizures since last seen; both occurred in one day.  Very alert. Behavior is the same. Hollers and grumbles.  We reviewed previous records. ZNS started 2017 and he has been taking  mg per day since approx 2017.  Group home records indicate weight 2017 138 pounds. 2018 139 pounds. 2019 108 pounds, then between 96 and 104 pounds subsequently. So bottom line is that he was on current ZNS dose for at least one year; probably longer with his usual weight greater than 138 pounds prior to onset of weight loss which occurred sometime between 2018 and 2019. Staff reports that he is getting ensure supplements and that he takes nutrition po well without dysphagia. Staff reports that nutrition feels he is getting adequate calories.    Current Outpatient Medications   Medication Sig Dispense Refill     alendronate (FOSAMAX) 70 MG tablet Take 70 mg by mouth every 7 days       Ascorbic Acid (VITAMIN C PO) Take 500 mg by mouth daily       Calcium Carb-Cholecalciferol (CALCIUM-VITAMIN D) 600-400 MG-UNIT TABS        diazepam (VALIUM) 5 MG/ML  (HIGH CONC) solution GIVE 1ML (5MG) BY MOUTH FOR GTC >5 MINUTES. IF CONTINUES FOR AN ADDITIONAL 5 MINUTES, CALL 911. MAX OF 2ML (10MG-2 DOSES) IN 24 HOURS. MONITOR BREATHING. *NO REFILLS REMAINING, FAXING FOR REFILLS* 30 mL 4     FOLIC ACID PO Take 1 mg by mouth daily       gentian violet 1 % solution Take 0.5 mLs by mouth as needed       levETIRAcetam (KEPPRA) 750 MG tablet Take 2 tablets (1,500 mg) by mouth 2 times daily 120 tablet 11     LEVOTHYROXINE SODIUM PO Take 88 mcg by mouth daily       Magnesium Hydroxide (MILK OF MAGNESIA PO)        PANTOPRAZOLE SODIUM PO Take 40 mg by mouth daily       phenylephrine-shark liver oil-mineral oil-petrolatum (PREPARATION H) 0.25-14-74.9 % rectal ointment Place rectally 2 times daily as needed for hemorrhoids       phenytoin (DILANTIN) 100 MG capsule Take 1 cap po BID ( together with 30 mg cap as directed) 60 capsule 11     phenytoin (DILANTIN) 30 MG capsule Take 2 capsules (60 mg) by mouth daily Two 30 mg tabs at bedtime with one 100 mg tab in the hs 60 capsule 11     Polysaccharide Iron Complex (FERREX 150 PO)        pramipexole (MIRAPEX) 0.125 MG tablet TAKE 1 TABLET BY MOUTH AT BEDTIME. 31 tablet 10     study - bisacodyl (IDS# 4779) 10 MG Suppository Place 10 mg rectally daily as needed for constipation       zonisamide (ZONEGRAN) 100 MG capsule 300 mg at bedtime. 90 capsule 11        Medication Notes:    PHT (generic 100, brand 30) 100-160. ZNS, levetiracetam as above.     AED Medication Compliance:  compliant all of the time  Using a pill box:  Medications are administered to patient.    Review of Systems:  Patient unable to provide, information obtained from caregivers. No evidence of dysphagia, cough, wheezing, hemoptysis, chest pain, leg swelling, significant weight change, fevers, nausea, vomiting, change in bowel habits, blood per rectum, frequency, kidney stones.  Have you experienced a traumatic fall since your last visit: NO  Are these falls related to your  seizures: Not Applicable    Other Issues:    Weight stable; no progress in   Is patient safe to drive:  No    Exam:    There were no vitals taken for this visit.     Wt Readings from Last 5 Encounters:   07/17/17 134 lb (60.8 kg)   04/03/17 139 lb 8 oz (63.3 kg)     Weight 96 to 98 pounds. He appears alert with eyes open. He does not speak, does not follow commands and does not repeat. Eyes are conjugate and EOM appear full. Moves all limbs. In wheel chair. Face moves well bilaterally. He will not grasp items and will not transfer.    Results for NUSRAT UG (MRN [id number]) as of 7/23/2020 18:42   Ref. Range 8/21/2019 10:14 6/11/2020 11:02   Keppra (Levetiracetam) Level Latest Ref Range: 12 - 46 ug/mL 70.5 (A) 65.6 (A)   Phenytoin Free Latest Ref Range: 1 - 2   1.5   Dilantin Latest Ref Range: 10 - 20 mg/L 12.1 14.9   Zonisamide Level Quant Latest Ref Range: 10 - 40 ug/mL  4.7 (A)       IMPRESSION  1) Probable symptomatic generalized epilepsy with tonic and tonic clonic seizures.  2) Continues with significant improvement in seizure frequency on zonisamide.  3) Signficant weight loss. Etiology unclear. Review of group home records indicates that weight was stable for more than one year on current doses of zonisamide prior to current weight loss. Low zonisamide level further argues against zonisamide being etiology. In fact low level on approx 6 mg/kg/day argues for a rather high zonisamide clearance. However phenytoin clearance does not appear particularly high.  4) Concern that levels have increased with weight loss has not been borne out except for increased levetiracetam level. This is in face of a normal creatinine clearance. Overall picture suggests increased hepatic but normal renal clearance.     DISCUSSION  Above discussed with guardian. I suggested that above information does not support idea that zonisamide is responsible for weight loss tho I cannot completely exclude. There is good reason to  believe that seizures would worsen significantly if zonisamide were to be reduced or stopped. Other AEDs such as rufinamide or clobazam could be used but no guarantee they would be as effective as zonsiamide; topiramate and felbatol not desirable option because these also associated with weight loss. Guardian felt that zonisamide should be continued. We talked about reducing levetiracetam and they felt this was reasonable.     PLAN:  1) Same phenytoin and zonisamide. Reduce levetiracetam to 750 mg 1.5 tablets (1125 mg) twice a day.  2) would be happy to suggest alternative medications if weight loss continued and primary care advocated strongly for pursuing this course.  3)      Monty Merchant MD

## 2020-07-23 NOTE — LETTER
2020     RE: Charles Lugo  : 1955   MRN: [id number]      Dear Colleague,    Thank you for referring your patient, Charles Lugo, to the [facility] EPILEPSY CARE at [facility]. Please see a copy of my visit note below.    Charles Lugo is a 65 year old male who is being evaluated via a billable video visit.        Video-Visit Details  VIDEO VISIT PARAMETERS  Video visit application used: [employer]  Patient or representative agreed to visit? Yes  Patient initiated visit: Yes  Patient location during visit: group home  Physician location during visit: [facility] clinic  Time of visit start: 12 noon  Time of visit finish: 12:26 PM  Total time of visit: 26 minutes     Monty Merchant MD        [facility] Epilepsy Care Progress Note      Patient:  Charles Lugo  :  1955   Age:  65 year old   Today's Office Visit:  2020    Epilepsy Data:    Patient History  Primary Epileptologist/Provider: Monroe Escobedo M.D.  Patient Status: Chronic Intractable  Epilepsy Syndrome: Epilepsy unspecified  Epilepsy Syndrome Status: Undertermined - After Extensive Evaluation  Age of Onset: 4 years old  Other Relevant Dx/ Issues: severe developmental delay,      Tests/Surgery History  Last EE2017    Seizure Record  Current Visit Date: 20  Previous Visit Date: 20  Months since last visit: 3.45  Seizure Type 1: Unspecified Convulsion  Description of Sz Type 1: head will drop suddenly and then he may make some noises.  Then his arms will go up.  He will make fists with hands.  His eyes will roll back and sometimes he will have some leg kicking.  Then he will have convulsions with upper and lower extremities and his body.  Usually it lasts for 10 seconds to 45 seconds in duration  # of Type 1 Seizure since last visit: 2  Freq. Type 1 / Month: 0.58    History of Present Illness:     Only two seizures since last seen; both occurred in one day.  Very alert. Behavior  is the same. Carmine and calista.  We reviewed previous records. ZNS started April 2017 and he has been taking  mg per day since approx June 2017.  Group home records indicate weight July 2017 138 pounds. July 2018 139 pounds. Jan 2019 108 pounds, then between 96 and 104 pounds subsequently. So bottom line is that he was on current ZNS dose for at least one year; probably longer with his usual weight greater than 138 pounds prior to onset of weight loss which occurred sometime between July 2018 and Jan 2019. Staff reports that he is getting ensure supplements and that he takes nutrition po well without dysphagia. Staff reports that nutrition feels he is getting adequate calories.    Current Outpatient Medications   Medication Sig Dispense Refill     alendronate (FOSAMAX) 70 MG tablet Take 70 mg by mouth every 7 days       Ascorbic Acid (VITAMIN C PO) Take 500 mg by mouth daily       Calcium Carb-Cholecalciferol (CALCIUM-VITAMIN D) 600-400 MG-UNIT TABS        diazepam (VALIUM) 5 MG/ML (HIGH CONC) solution GIVE 1ML (5MG) BY MOUTH FOR GTC >5 MINUTES. IF CONTINUES FOR AN ADDITIONAL 5 MINUTES, CALL 911. MAX OF 2ML (10MG-2 DOSES) IN 24 HOURS. MONITOR BREATHING. *NO REFILLS REMAINING, FAXING FOR REFILLS* 30 mL 4     FOLIC ACID PO Take 1 mg by mouth daily       gentian violet 1 % solution Take 0.5 mLs by mouth as needed       levETIRAcetam (KEPPRA) 750 MG tablet Take 2 tablets (1,500 mg) by mouth 2 times daily 120 tablet 11     LEVOTHYROXINE SODIUM PO Take 88 mcg by mouth daily       Magnesium Hydroxide (MILK OF MAGNESIA PO)        PANTOPRAZOLE SODIUM PO Take 40 mg by mouth daily       phenylephrine-shark liver oil-mineral oil-petrolatum (PREPARATION H) 0.25-14-74.9 % rectal ointment Place rectally 2 times daily as needed for hemorrhoids       phenytoin (DILANTIN) 100 MG capsule Take 1 cap po BID ( together with 30 mg cap as directed) 60 capsule 11     phenytoin (DILANTIN) 30 MG capsule Take 2 capsules (60 mg) by  mouth daily Two 30 mg tabs at bedtime with one 100 mg tab in the hs 60 capsule 11     Polysaccharide Iron Complex (FERREX 150 PO)        pramipexole (MIRAPEX) 0.125 MG tablet TAKE 1 TABLET BY MOUTH AT BEDTIME. 31 tablet 10     study - bisacodyl (IDS# 4779) 10 MG Suppository Place 10 mg rectally daily as needed for constipation       zonisamide (ZONEGRAN) 100 MG capsule 300 mg at bedtime. 90 capsule 11        Medication Notes:    PHT (generic 100, brand 30) 100-160. ZNS, levetiracetam as above.     AED Medication Compliance:  compliant all of the time  Using a pill box:  Medications are administered to patient.    Review of Systems:  Patient unable to provide, information obtained from caregivers. No evidence of dysphagia, cough, wheezing, hemoptysis, chest pain, leg swelling, significant weight change, fevers, nausea, vomiting, change in bowel habits, blood per rectum, frequency, kidney stones.  Have you experienced a traumatic fall since your last visit: NO  Are these falls related to your seizures: Not Applicable    Other Issues:    Weight stable; no progress in   Is patient safe to drive:  No    Exam:    There were no vitals taken for this visit.     Wt Readings from Last 5 Encounters:   07/17/17 134 lb (60.8 kg)   04/03/17 139 lb 8 oz (63.3 kg)     Weight 96 to 98 pounds. He appears alert with eyes open. He does not speak, does not follow commands and does not repeat. Eyes are conjugate and EOM appear full. Moves all limbs. In wheel chair. Face moves well bilaterally. He will not grasp items and will not transfer.    Results for NUSRAT GU (MRN [id number]) as of 7/23/2020 18:42   Ref. Range 8/21/2019 10:14 6/11/2020 11:02   Keppra (Levetiracetam) Level Latest Ref Range: 12 - 46 ug/mL 70.5 (A) 65.6 (A)   Phenytoin Free Latest Ref Range: 1 - 2   1.5   Dilantin Latest Ref Range: 10 - 20 mg/L 12.1 14.9   Zonisamide Level Quant Latest Ref Range: 10 - 40 ug/mL  4.7 (A)       IMPRESSION  1) Probable symptomatic  generalized epilepsy with tonic and tonic clonic seizures.  2) Continues with significant improvement in seizure frequency on zonisamide.  3) Signficant weight loss. Etiology unclear. Review of group home records indicates that weight was stable for more than one year on current doses of zonisamide prior to current weight loss. Low zonisamide level further argues against zonisamide being etiology. In fact low level on approx 6 mg/kg/day argues for a rather high zonisamide clearance. However phenytoin clearance does not appear particularly high.  4) Concern that levels have increased with weight loss has not been borne out except for increased levetiracetam level. This is in face of a normal creatinine clearance. Overall picture suggests increased hepatic but normal renal clearance.     DISCUSSION  Above discussed with guardian. I suggested that above information does not support idea that zonisamide is responsible for weight loss tho I cannot completely exclude. There is good reason to believe that seizures would worsen significantly if zonisamide were to be reduced or stopped. Other AEDs such as rufinamide or clobazam could be used but no guarantee they would be as effective as zonsiamide; topiramate and felbatol not desirable option because these also associated with weight loss. Guardian felt that zonisamide should be continued. We talked about reducing levetiracetam and they felt this was reasonable.     PLAN:  1) Same phenytoin and zonisamide. Reduce levetiracetam to 750 mg 1.5 tablets (1125 mg) twice a day.  2) would be happy to suggest alternative medications if weight loss continued and primary care advocated strongly for pursuing this course.  3)      Monty Merchant MD

## 2020-09-04 ENCOUNTER — TELEPHONE (OUTPATIENT)
Dept: NEUROLOGY | Facility: CLINIC | Age: 65
End: 2020-09-04

## 2020-09-30 ENCOUNTER — TRANSFERRED RECORDS (OUTPATIENT)
Dept: HEALTH INFORMATION MANAGEMENT | Facility: CLINIC | Age: 65
End: 2020-09-30

## 2020-10-01 DIAGNOSIS — G40.909 RECURRENT SEIZURES (H): ICD-10-CM

## 2020-10-01 RX ORDER — DIAZEPAM ORAL SOLUTION (CONCENTRATE) 5 MG/ML
SOLUTION ORAL
Qty: 30 ML | Refills: 1 | Status: SHIPPED | OUTPATIENT
Start: 2020-10-01

## 2020-10-01 NOTE — TELEPHONE ENCOUNTER
diazePAM 5 MG/ML Concentrate  Last Written Prescription Date:  1/22/2020  Last Fill Quantity: 30,   # refills: 4  Last Office Visit : 7/23/2020  Future Office visit:  11/24/2020    Routing refill request to provider for review/approval because:  Drug not on the [facility], [facility] or [facility] refill protocol or controlled substance      Carmina Oliver RN  Central Triage Red Flags/Med Refills

## 2020-10-23 ENCOUNTER — TELEPHONE (OUTPATIENT)
Dept: NEUROLOGY | Facility: CLINIC | Age: 65
End: 2020-10-23

## 2020-10-23 NOTE — TELEPHONE ENCOUNTER
What is the concern that needs to be addressed by a nurse? Patients Sister called wanting to discuss having some of patients medication removed. Cyn stated Charles has been moved to Hospice care and is taking a number of different medications every day. She also said she had mailed a letter to Dr. Merchant a few months ago and wanted to check if he had received it.    May a detailed message be left on voicemail? yes    Date of last office visit: 7/23/20    Message routed to: Dori vides

## 2020-10-23 NOTE — TELEPHONE ENCOUNTER
Letter had been sent to update  that Charles had been placed in Hospice care, and that Cyn was his guardian.  Cyn wondered if that had been received by .    Cyn would like to to have a medication review performed by , because the hospice providers have added a number of medications. She feels Charles has too many medications.    I recommended a video visit, but Cyn is not sure if that is possible now Charles is in hospice care.  She notes that all orders need to go through the providers in [facility].    I recommended that she talk with the hospice providers / staff to express her concerns (as Charles's legal guardian), and ask that they consider consulting with  if there are questions.    Cyn would like to have a medication list faxed to  for his review.  I provided Cyn with the clinic fax so she could ask the hospice staff to send a medication list.    No other questions at this time

## 2020-11-04 ENCOUNTER — MEDICAL CORRESPONDENCE (OUTPATIENT)
Dept: HEALTH INFORMATION MANAGEMENT | Facility: CLINIC | Age: 65
End: 2020-11-04

## 2020-11-04 ENCOUNTER — VIRTUAL VISIT (OUTPATIENT)
Dept: NEUROLOGY | Facility: CLINIC | Age: 65
End: 2020-11-04
Payer: MEDICARE

## 2020-11-04 DIAGNOSIS — G40.909 SEIZURE SYNDROME (H): ICD-10-CM

## 2020-11-04 DIAGNOSIS — G40.812 LENNOX-GASTAUT SYNDROME WITH TONIC SEIZURES (H): ICD-10-CM

## 2020-11-04 RX ORDER — LEVETIRACETAM 750 MG/1
TABLET ORAL
Qty: 62 TABLET | Refills: 11 | Status: SHIPPED | OUTPATIENT
Start: 2020-11-04 | End: 2021-01-01

## 2020-11-04 NOTE — PATIENT INSTRUCTIONS
You reported that Charles weighed about 90 pounds today.    Based on weight we have suggested that you reduce the levetiracetam to 750 mg twice daily.  Reduce the Dilantin to 100 mg in the morning and 130 mg in the evening.  Keep the zonisamide the same.    I am sending Dr Barnes my notes but will try to connect with him personally.  He can call [phone] if there are questions or if we need to manage seizures together.

## 2020-11-04 NOTE — LETTER
"2020       RE: Charles Lugo  : 1955   MRN: [id number]      Dear Colleague,    Thank you for referring your patient, Charles Lugo, to the [facility] EPILEPSY CARE at [facility]. Please see a copy of my visit note below.    Charles Lugo is a 65 year old male who is being evaluated via a billable video visit.      The patient has been notified of following:     \"This video visit will be conducted via a call between you and your physician/provider. We have found that certain health care needs can be provided without the need for an in-person physical exam.  This service lets us provide the care you need with a video conversation.  If a prescription is necessary we can send it directly to your pharmacy.  If lab work is needed we can place an order for that and you can then stop by our lab to have the test done at a later time.    Video visits are billed at different rates depending on your insurance coverage.  Please reach out to your insurance provider with any questions.    If during the course of the call the physician/provider feels a video visit is not appropriate, you will not be charged for this service.\"    Patient has given verbal consent for Video visit? Yes  How would you like to obtain your AVS? Mail a copy  If you are dropped from the video visit, the video invite should be resent to: Send to e-mail at: [email or web address]  Will anyone else be joining your video visit?  Harika          Video-Visit Details    Type of service:  Video Visit    Please see physician note for televisit parameters.    Monty Merchant MD        [facility] Epilepsy Care Progress Note      Patient:  Charles Lugo  :  1955   Age:  65 year old   Today's Office Visit:  2020    Epilepsy Data:    Patient History  Primary Epileptologist/Provider: Monroe Escobedo M.D.  Patient Status: Chronic Intractable  Epilepsy Syndrome: Epilepsy unspecified  Epilepsy Syndrome " Status: Undertermined - After Extensive Evaluation  Age of Onset: 4 years old  Other Relevant Dx/ Issues: severe developmental delay,      Tests/Surgery History  Last EE2017    Seizure Record  Current Visit Date: 20  Previous Visit Date: 20  Months since last visit: 3.42  Seizure Type 1: Unspecified Convulsion  Description of Sz Type 1: head will drop suddenly and then he may make some noises.  Then his arms will go up.  He will make fists with hands.  His eyes will roll back and sometimes he will have some leg kicking.  Then he will have convulsions with upper and lower extremities and his body.  Usually it lasts for 10 seconds to 45 seconds in duration  # of Type 1 Seizure since last visit: 1  Freq. Type 1 / Month: 0.29    History of Present Illness:     He continues in hospice care. He is still taking po. Still alert and interactive during long periods of time.  Single seizure, generalized lasting three minutes. Had some jerking during seizure. Sleepy afterwards.    Family reports that Bro Barnes [phone] cares for him at Hospice and they would like me to connect with him.    Current Outpatient Medications   Medication Sig Dispense Refill     alendronate (FOSAMAX) 70 MG tablet Take 70 mg by mouth every 7 days       Ascorbic Acid (VITAMIN C PO) Take 500 mg by mouth daily       Calcium Carb-Cholecalciferol (CALCIUM-VITAMIN D) 600-400 MG-UNIT TABS        diazepam (VALIUM) 5 MG/ML (HIGH CONC) solution GIVE 1ML (5MG) BY MOUTH FOR GTC >5 MINUTES. IF CONTINUES FOR AN ADDITIONAL 5 MINUTES, CALL 911. MAX OF 2ML (10MG-2 DOSES) IN 24 HOURS. MONITOR BREATHING. *ORDER WHEN LOW* *5 TOTAL FILLS* 30 mL 1     FOLIC ACID PO Take 1 mg by mouth daily       gentian violet 1 % solution Take 0.5 mLs by mouth as needed       levETIRAcetam (KEPPRA) 750 MG tablet Take one and one half tablets twice daily (total 2250 mg per day) 93 tablet 11     LEVOTHYROXINE SODIUM PO Take 88 mcg by mouth daily       Magnesium  Hydroxide (MILK OF MAGNESIA PO)        PANTOPRAZOLE SODIUM PO Take 40 mg by mouth daily       phenylephrine-shark liver oil-mineral oil-petrolatum (PREPARATION H) 0.25-14-74.9 % rectal ointment Place rectally 2 times daily as needed for hemorrhoids       phenytoin (DILANTIN) 100 MG capsule Take 1 cap po BID ( together with 30 mg cap as directed) 60 capsule 11     phenytoin (DILANTIN) 30 MG capsule Take 2 capsules (60 mg) by mouth daily Two 30 mg tabs at bedtime with one 100 mg tab in the hs 60 capsule 11     Polysaccharide Iron Complex (FERREX 150 PO)        pramipexole (MIRAPEX) 0.125 MG tablet TAKE 1 TABLET BY MOUTH AT BEDTIME. 31 tablet 10     study - bisacodyl (IDS# 4779) 10 MG Suppository Place 10 mg rectally daily as needed for constipation       zonisamide (ZONEGRAN) 100 MG capsule 300 mg at bedtime. 90 capsule 11        Medication Notes:    Taking quetiapine 25 tid for agitation.   Still taking pramipexole. Not clear whether taking aledronate.  Medications are administered to patient.    Review of Systems:  Patient unable to provide, information obtained from caregivers. No evidence of dysphagia, cough, wheezing, hemoptysis, chest pain, leg swelling.  He has lost another 8 pounds since last visit. No nausea, vomiting.    Exam:    There were no vitals taken for this visit.     Wt Readings from Last 5 Encounters:   07/17/17 134 lb (60.8 kg)   04/03/17 139 lb 8 oz (63.3 kg)     Weight has gone down to 90 pounds from 98 pounds reported in July.  He is alert throughout interview. Neck tone is good. Eyes are conjugate.  He does not follow commands and does not speak which is his baseline.  Staff range is arms and tone is increased bilaterally.    IMPRESSION  1) Probable symptomatic generalized epilepsy with tonic and tonic clonic seizures. Continues with convulsive seizures indicating ongoing seizure tendency in spite of three antiseizure medications.  2) Continues with significant improvement in seizure frequency  on zonisamide. Tho he is taking 300 mg per day his level in June was 4.7 which is subtherapeutic. Nonetheless has had good response.  3) Signficant weight loss which started more than a year after zonisamide initiated and so is unlikely related to this medication. Staff report he is DNR DNI and that working diagnosis is carcinoma. Evaluation reportedly held off given overall condition. Only levetiracetam level was increased following weight loss tho AED doses stable.        DISCUSSION  Family continued to express concern that he was overmedicated. Since I cannot obtain AED levels this is hard to exclude but is unlikely given his level of alertness and the low therapuetic levels last visit (except for levetiracetam dose of which was reduced). Occasional occurrence of lengthy convulsions indicates ongoing seizure tendency. We discussed how family felt about possibility of significantly worsening seizures interfering with his hospice stay and they wanted to avoid this. We spent some time discussing pros and cons of modest AED reductions. Given current weight, recent levels, and ongoing seizures I am not in favor of significant AED reductions. Family also expressed worries about handling convulsions if NPO is required. We discussed parenteral (IV) availabilities which are limited to benzodiazpines, fosphenytoin and lacosamide; fosphenytoin and midazolam can be given IM, others can be given rectally but unfortunately not zonisamide.     PLAN:  1) Same zonisamide. Reduce levetiracetam to 750 mg twice a day (total 1500 mg per day or approx 40 mg/kg). Reduce phenytoin to 230 mg/d (approx 6 mg/kg/d).  2) would be happy to suggest alternative medications if weight loss continued and primary care advocated strongly for pursuing this course.  3) would be happy to discuss with primary care parenteral care if needed. I have included Dr Bro Barnes on treatment team and will try to communicate with him personally.      VIDEO  VISIT PARAMETERS  Video visit application used: [employer]  Patient or representative agreed to visit? Yes  Patient initiated visit: Yes  Patient location during visit: hospice  Physician location during visit: [facility]  Time of visit start: 130 PM  Time of visit finish: 156 PM  Total time of visit: 26 minutes.     Monty Merchant MD

## 2020-11-04 NOTE — PROGRESS NOTES
"Charles Lugo is a 65 year old male who is being evaluated via a billable video visit.      The patient has been notified of following:     \"This video visit will be conducted via a call between you and your physician/provider. We have found that certain health care needs can be provided without the need for an in-person physical exam.  This service lets us provide the care you need with a video conversation.  If a prescription is necessary we can send it directly to your pharmacy.  If lab work is needed we can place an order for that and you can then stop by our lab to have the test done at a later time.    Video visits are billed at different rates depending on your insurance coverage.  Please reach out to your insurance provider with any questions.    If during the course of the call the physician/provider feels a video visit is not appropriate, you will not be charged for this service.\"    Patient has given verbal consent for Video visit? Yes  How would you like to obtain your AVS? Mail a copy  If you are dropped from the video visit, the video invite should be resent to: Send to e-mail at: [email or web address]  Will anyone else be joining your video visit?  Harika          Video-Visit Details    Type of service:  Video Visit    Please see physician note for televisit parameters.    Monty Merchant MD        [facility] Progress Note      Patient:  Charles Lugo  :  1955   Age:  65 year old   Today's Office Visit:  2020    Epilepsy Data:    Patient History  Primary Epileptologist/Provider: Monroe Escobedo M.D.  Patient Status: Chronic Intractable  Epilepsy Syndrome: Epilepsy unspecified  Epilepsy Syndrome Status: Undertermined - After Extensive Evaluation  Age of Onset: 4 years old  Other Relevant Dx/ Issues: severe developmental delay,      Tests/Surgery History  Last EE2017    Seizure Record  Current Visit Date: 20  Previous Visit Date: 20  Months since last " visit: 3.42  Seizure Type 1: Unspecified Convulsion  Description of Sz Type 1: head will drop suddenly and then he may make some noises.  Then his arms will go up.  He will make fists with hands.  His eyes will roll back and sometimes he will have some leg kicking.  Then he will have convulsions with upper and lower extremities and his body.  Usually it lasts for 10 seconds to 45 seconds in duration  # of Type 1 Seizure since last visit: 1  Freq. Type 1 / Month: 0.29    History of Present Illness:     He continues in hospice care. He is still taking po. Still alert and interactive during long periods of time.  Single seizure, generalized lasting three minutes. Had some jerking during seizure. Sleepy afterwards.    Family reports that Bro Barnes [phone] cares for him at Hosp[facility] and they would like me to connect with him.    Current Outpatient Medications   Medication Sig Dispense Refill     alendronate (FOSAMAX) 70 MG tablet Take 70 mg by mouth every 7 days       Ascorbic Acid (VITAMIN C PO) Take 500 mg by mouth daily       Calcium Carb-Cholecalciferol (CALCIUM-VITAMIN D) 600-400 MG-UNIT TABS        diazepam (VALIUM) 5 MG/ML (HIGH CONC) solution GIVE 1ML (5MG) BY MOUTH FOR GTC >5 MINUTES. IF CONTINUES FOR AN ADDITIONAL 5 MINUTES, CALL 911. MAX OF 2ML (10MG-2 DOSES) IN 24 HOURS. MONITOR BREATHING. *ORDER WHEN LOW* *5 TOTAL FILLS* 30 mL 1     FOLIC ACID PO Take 1 mg by mouth daily       gentian violet 1 % solution Take 0.5 mLs by mouth as needed       levETIRAcetam (KEPPRA) 750 MG tablet Take one and one half tablets twice daily (total 2250 mg per day) 93 tablet 11     LEVOTHYROXINE SODIUM PO Take 88 mcg by mouth daily       Magnesium Hydroxide (MILK OF MAGNESIA PO)        PANTOPRAZOLE SODIUM PO Take 40 mg by mouth daily       phenylephrine-shark liver oil-mineral oil-petrolatum (PREPARATION H) 0.25-14-74.9 % rectal ointment Place rectally 2 times daily as needed for hemorrhoids       phenytoin (DILANTIN) 100  MG capsule Take 1 cap po BID ( together with 30 mg cap as directed) 60 capsule 11     phenytoin (DILANTIN) 30 MG capsule Take 2 capsules (60 mg) by mouth daily Two 30 mg tabs at bedtime with one 100 mg tab in the hs 60 capsule 11     Polysaccharide Iron Complex (FERREX 150 PO)        pramipexole (MIRAPEX) 0.125 MG tablet TAKE 1 TABLET BY MOUTH AT BEDTIME. 31 tablet 10     study - bisacodyl (IDS# 4779) 10 MG Suppository Place 10 mg rectally daily as needed for constipation       zonisamide (ZONEGRAN) 100 MG capsule 300 mg at bedtime. 90 capsule 11        Medication Notes:    Taking quetiapine 25 tid for agitation.   Still taking pramipexole. Not clear whether taking aledronate.  Medications are administered to patient.    Review of Systems:  Patient unable to provide, information obtained from caregivers. No evidence of dysphagia, cough, wheezing, hemoptysis, chest pain, leg swelling.  He has lost another 8 pounds since last visit. No nausea, vomiting.    Exam:    There were no vitals taken for this visit.     Wt Readings from Last 5 Encounters:   07/17/17 134 lb (60.8 kg)   04/03/17 139 lb 8 oz (63.3 kg)     Weight has gone down to 90 pounds from 98 pounds reported in July.  He is alert throughout interview. Neck tone is good. Eyes are conjugate.  He does not follow commands and does not speak which is his baseline.  Staff range is arms and tone is increased bilaterally.    IMPRESSION  1) Probable symptomatic generalized epilepsy with tonic and tonic clonic seizures. Continues with convulsive seizures indicating ongoing seizure tendency in spite of three antiseizure medications.  2) Continues with significant improvement in seizure frequency on zonisamide. Tho he is taking 300 mg per day his level in June was 4.7 which is subtherapeutic. Nonetheless has had good response.  3) Signficant weight loss which started more than a year after zonisamide initiated and so is unlikely related to this medication. Staff report  he is DNR DNI and that working diagnosis is carcinoma. Evaluation reportedly held off given overall condition. Only levetiracetam level was increased following weight loss tho AED doses stable.        DISCUSSION  Family continued to express concern that he was overmedicated. Since I cannot obtain AED levels this is hard to exclude but is unlikely given his level of alertness and the low therapuetic levels last visit (except for levetiracetam dose of which was reduced). Occasional occurrence of lengthy convulsions indicates ongoing seizure tendency. We discussed how family felt about possibility of significantly worsening seizures interfering with his hospice stay and they wanted to avoid this. We spent some time discussing pros and cons of modest AED reductions. Given current weight, recent levels, and ongoing seizures I am not in favor of significant AED reductions. Family also expressed worries about handling convulsions if NPO is required. We discussed parenteral (IV) availabilities which are limited to benzodiazpines, fosphenytoin and lacosamide; fosphenytoin and midazolam can be given IM, others can be given rectally but unfortunately not zonisamide.     PLAN:  1) Same zonisamide. Reduce levetiracetam to 750 mg twice a day (total 1500 mg per day or approx 40 mg/kg). Reduce phenytoin to 230 mg/d (approx 6 mg/kg/d).  2) would be happy to suggest alternative medications if weight loss continued and primary care advocated strongly for pursuing this course.  3) would be happy to discuss with primary care parenteral care if needed. I have included Dr Bro Barnes on treatment team and will try to communicate with him personally.      VIDEO VISIT PARAMETERS  Video visit application used: [employer]  Patient or representative agreed to visit? Yes  Patient initiated visit: Yes  Patient location during visit: hospice  Physician location during visit: [facility]  Time of visit start: 130 PM  Time of visit finish: 156  PM  Total time of visit: 26 minutes.     Monty Merchant MD

## 2020-11-05 ENCOUNTER — TELEPHONE (OUTPATIENT)
Dept: NEUROLOGY | Facility: CLINIC | Age: 65
End: 2020-11-05

## 2020-11-13 RX ORDER — QUETIAPINE FUMARATE 25 MG/1
25 TABLET, FILM COATED ORAL 3 TIMES DAILY
COMMUNITY

## 2020-11-13 RX ORDER — HALOPERIDOL 0.5 MG/1
0.5 TABLET ORAL
COMMUNITY

## 2020-11-13 RX ORDER — GLYCOPYRROLATE 1 MG/1
1 TABLET ORAL 3 TIMES DAILY PRN
COMMUNITY

## 2020-11-13 RX ORDER — LORAZEPAM 0.5 MG/1
0.5 TABLET ORAL EVERY 4 HOURS PRN
COMMUNITY

## 2020-11-13 RX ORDER — MORPHINE SULFATE 30 MG/1
5 TABLET ORAL
COMMUNITY

## 2020-11-13 RX ORDER — QUETIAPINE FUMARATE 25 MG/1
25 TABLET, FILM COATED ORAL
COMMUNITY

## 2020-11-13 NOTE — TELEPHONE ENCOUNTER
Forms reviewed. New medications are noted on the paperwork, these were added to the patient's record here. AVS printed along with the new medication orders.

## 2021-01-01 ENCOUNTER — TELEPHONE (OUTPATIENT)
Dept: NEUROLOGY | Facility: CLINIC | Age: 66
End: 2021-01-01

## 2021-01-01 ENCOUNTER — VIRTUAL VISIT (OUTPATIENT)
Dept: NEUROLOGY | Facility: CLINIC | Age: 66
End: 2021-01-01
Payer: MEDICARE

## 2021-01-01 ENCOUNTER — MEDICAL CORRESPONDENCE (OUTPATIENT)
Dept: HEALTH INFORMATION MANAGEMENT | Facility: CLINIC | Age: 66
End: 2021-01-01

## 2021-01-01 ENCOUNTER — TRANSFERRED RECORDS (OUTPATIENT)
Dept: HEALTH INFORMATION MANAGEMENT | Facility: CLINIC | Age: 66
End: 2021-01-01

## 2021-01-01 DIAGNOSIS — G40.909 SEIZURE DISORDER (H): ICD-10-CM

## 2021-01-01 DIAGNOSIS — G40.909 SEIZURE SYNDROME (H): ICD-10-CM

## 2021-01-01 DIAGNOSIS — G25.81 RESTLESS LEGS SYNDROME: ICD-10-CM

## 2021-01-01 DIAGNOSIS — G40.812 LENNOX-GASTAUT SYNDROME WITH TONIC SEIZURES (H): Primary | ICD-10-CM

## 2021-01-01 RX ORDER — ZONISAMIDE 100 MG/1
CAPSULE ORAL
Qty: 90 CAPSULE | Refills: 9 | Status: SHIPPED | OUTPATIENT
Start: 2021-01-01

## 2021-01-01 RX ORDER — PRAMIPEXOLE DIHYDROCHLORIDE 0.12 MG/1
0.12 TABLET ORAL AT BEDTIME
Qty: 30 TABLET | Refills: 0 | Status: SHIPPED | OUTPATIENT
Start: 2021-01-01

## 2021-01-01 RX ORDER — AMOXICILLIN 250 MG
1 CAPSULE ORAL 2 TIMES DAILY PRN
COMMUNITY

## 2021-01-01 RX ORDER — PRAMIPEXOLE DIHYDROCHLORIDE 0.12 MG/1
0.12 TABLET ORAL AT BEDTIME
Qty: 30 TABLET | Refills: 2 | Status: SHIPPED | OUTPATIENT
Start: 2021-01-01 | End: 2021-01-01

## 2021-01-01 RX ORDER — PHENYTOIN SODIUM 100 MG/1
CAPSULE, EXTENDED RELEASE ORAL
Qty: 60 CAPSULE | Refills: 11 | Status: SHIPPED | OUTPATIENT
Start: 2021-01-01

## 2021-01-01 RX ORDER — ZONISAMIDE 100 MG/1
CAPSULE ORAL
Qty: 45 CAPSULE | Refills: 11 | OUTPATIENT
Start: 2021-01-01

## 2021-01-01 RX ORDER — ZONISAMIDE 100 MG/1
CAPSULE ORAL
OUTPATIENT
Start: 2021-01-01

## 2021-01-01 RX ORDER — LEVOTHYROXINE SODIUM 50 UG/1
50 TABLET ORAL DAILY
COMMUNITY

## 2021-01-01 RX ORDER — PRAMIPEXOLE DIHYDROCHLORIDE 0.12 MG/1
0.12 TABLET ORAL AT BEDTIME
Qty: 30 TABLET | Refills: 0 | Status: SHIPPED | OUTPATIENT
Start: 2021-01-01 | End: 2021-01-01

## 2021-01-01 RX ORDER — LEVETIRACETAM 750 MG/1
TABLET ORAL
Qty: 31 TABLET | Refills: 11 | Status: SHIPPED | OUTPATIENT
Start: 2021-01-01

## 2021-01-01 RX ORDER — ONDANSETRON 4 MG/1
4 TABLET, ORALLY DISINTEGRATING ORAL EVERY 8 HOURS PRN
COMMUNITY

## 2021-01-26 NOTE — PROGRESS NOTES
Charles is a 65 year old who is being evaluated via a billable video visit.      How would you like to obtain your AVS? Mail a copy  If the video visit is dropped, the invitation should be resent by: Send to e-mail at: [email or web address]    Will anyone else be joining your video visit? Nurse or caretaker       Please see physician note for televisit parameters.    Monty Merchant MD      [facility] Progress Note      Patient:  Charles Lugo  :  1955   Age:  65 year old   Today's Office Visit:  2021    Epilepsy Data:    Patient History  Primary Epileptologist/Provider: Monroe Escobedo M.D.  Patient Status: Chronic Intractable  Epilepsy Syndrome: Epilepsy unspecified  Epilepsy Syndrome Status: Undertermined - After Extensive Evaluation  Age of Onset: 4 years old  Other Relevant Dx/ Issues: severe developmental delay,      Tests/Surgery History  Last EE2017    Seizure Record  Current Visit Date: 21  Previous Visit Date: 20  Months since last visit: 2.73  Seizure Type 1: Unspecified Convulsion  Description of Sz Type 1: head will drop suddenly and then he may make some noises.  Then his arms will go up.  He will make fists with hands.  His eyes will roll back and sometimes he will have some leg kicking.  Then he will have convulsions with upper and lower extremities and his body.  Usually it lasts for 10 seconds to 45 seconds in duration  # of Type 1 Seizure since last visit: 0  Freq. 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  There was significant improvement with addition of zonisamide and convulsive seizures became rare.  He started experiencing progressive weight loss more than a year following initiation of zonisamide. 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 He is DNR/DNI so primary care did not pursue further  evaluation.    No seizures since last visit four months ago. This is an improvement compared to previous visit. More screaming.  We had reduced levetiracetam since last visit and this was not associated with seizure worsening.  Still taking po and staff reports reasonable appetite; no coughing when swallows.      Current Outpatient Medications   Medication Sig Dispense Refill     alendronate (FOSAMAX) 70 MG tablet Take 70 mg by mouth every 7 days       Ascorbic Acid (VITAMIN C PO) Take 500 mg by mouth daily       Calcium Carb-Cholecalciferol (CALCIUM-VITAMIN D) 600-400 MG-UNIT TABS        diazepam (VALIUM) 5 MG/ML (HIGH CONC) solution GIVE 1ML (5MG) BY MOUTH FOR GTC >5 MINUTES. IF CONTINUES FOR AN ADDITIONAL 5 MINUTES, CALL 911. MAX OF 2ML (10MG-2 DOSES) IN 24 HOURS. MONITOR BREATHING. *ORDER WHEN LOW* *5 TOTAL FILLS* 30 mL 1     FOLIC ACID PO Take 1 mg by mouth daily       gentian violet 1 % solution Take 0.5 mLs by mouth as needed       glycopyrrolate (ROBINUL) 1 MG tablet Take 1 mg by mouth 3 times daily as needed for secretions       haloperidol (HALDOL) 0.5 MG tablet Take 0.5 mg by mouth every 2 hours as needed for hallucinations, agitation or nausea       levETIRAcetam (KEPPRA) 750 MG tablet Take one tablet twice daily (total 1500 mg per day) 62 tablet 11     LEVOTHYROXINE SODIUM PO Take 88 mcg by mouth daily       LORazepam (ATIVAN) 0.5 MG tablet Take 0.5 mg by mouth every 4 hours as needed for anxiety, nausea, Insomnia or vomiting       Magnesium Hydroxide (MILK OF MAGNESIA PO)        PANTOPRAZOLE SODIUM PO Take 40 mg by mouth daily       phenylephrine-shark liver oil-mineral oil-petrolatum (PREPARATION H) 0.25-14-74.9 % rectal ointment Place rectally 2 times daily as needed for hemorrhoids       phenytoin (DILANTIN) 100 MG capsule Take 1 cap po BID ( together with 30 mg cap as directed) 60 capsule 11     phenytoin (DILANTIN) 30 MG capsule One 30 mg tabs at bedtime with one 100 mg tab in the hs 31 capsule 11      Polysaccharide Iron Complex (FERREX 150 PO)        pramipexole (MIRAPEX) 0.125 MG tablet TAKE 1 TABLET BY MOUTH AT BEDTIME. 31 tablet 10     QUEtiapine (SEROQUEL) 25 MG tablet Take 25 mg by mouth every 2 hours as needed for agitation or hallucinations       QUEtiapine (SEROQUEL) 25 MG tablet Take 25 mg by mouth 3 times daily       study - bisacodyl (IDS# 4779) 10 MG Suppository Place 10 mg rectally daily as needed for constipation       zonisamide (ZONEGRAN) 100 MG capsule 300 mg at bedtime. 90 capsule 11     morphine 5 MG solu-tab Take 5 mg by mouth every hour as needed Shortness of breath          Medication Notes:    Taking dilantin (100, 30) 100-130; it appears he is phenytoin for 100 mg and dilantin for 30 mg.  Other AEDs as above.   Still taking aledronate.  AED Medication Compliance:  compliant all of the time  Using a pill box:  medications administered to patient    Review of Systems:  Still can pivot and transfer. Having bowel movements. No blood.  Staff denies hematuria. Staff denies cough or fever.  Have you experienced a traumatic fall since your last visit: NO  Are these falls related to your seizures: Not Applicable    Other Issues:  DNR/DNI.    Exam:    There were no vitals taken for this visit.     Wt Readings from Last 5 Encounters:   07/17/17 134 lb (60.8 kg)   04/03/17 139 lb 8 oz (63.3 kg)     Currently reported weight 86.7 lbs, down from 90 lbs four months ago. Limited exam on video today. He was sleeping with eyes closed. Staff woke him up. Eyes were conjugate and pupils appeared equal. He moved both arms with stimulation.    Results for NUSRAT GU (MRN [id number]) as of 1/26/2021 16:10   Ref. Range 6/11/2020 11:02   Keppra (Levetiracetam) Level Latest Ref Range: 12 - 46 ug/mL 65.6 (A)   Phenytoin Free Latest Ref Range: 1 - 2  1.5   Dilantin Latest Ref Range: 10 - 20 mg/L 14.9   Zonisamide Level Quant Latest Ref Range: 10 - 40 ug/mL 4.7 (A)     He was taking levetiracetam 3000 mg/d  in Jun 2020.    IMPRESSION  1) Probable symptomatic generalized epilepsy with tonic and tonic clonic seizures. No seizures in last four months which is an improvement compared to previous interval. We had reduced levetiracetam from 3000 mg/d to 1500 mg/d in last interval. Not clear that levetiracetam is adding much to seizure control.  2) Continues with significant improvement in seizure frequency on zonisamide. Tho he is taking 300 mg per day his level in June was 4.7 which is subtherapeutic. Nonetheless has had good response.  3) Signficant weight loss which started more than a year after zonisamide initiated and so is unlikely related to this medication. Staff report he is DNR DNI and that working diagnosis is carcinoma. Evaluation reportedly held off given overall condition. Only levetiracetam level was increased following weight loss tho AED doses stable. Not clear that he requires aledronate in this setting and aledronate associated GI effects could conceivably be contributing to weight loss.     PLAN:  1) Same zonisamide. Reduce levetiracetam to 375 mg twice a day (total 750 mg per day or approx 20 mg/kg). Continue phenytoin to 230 mg/d (approx 6 mg/kg/d). Call if worsening seizures.  2) would be happy to suggest alternative medications if weight loss continued and primary care advocated strongly for pursuing this course.  3) Asked caregiver to connect with primary care and determine whether aledronate was needed here.  4) RTC 4 months. If continues doing well we could consider discontinuing levetiracetam.    VIDEO VISIT PARAMETERS  Video visit application used: [employer]  Patient or representative agreed to visit? Yes  Patient initiated visit: Yes  Patient location during visit: group home  Physician location during visit: [facility]  Time of visit start: 1105 AM  Time of visit finish: 1125 AM    Time of in person visit: 20 min. Additional 5 min reviewing chart prior to visit, additional 12 min generating  note after visit.   So spent total 37min on visit, all on day of in person visit.     Monty Merchant MD

## 2021-01-26 NOTE — PATIENT INSTRUCTIONS
Please reduce levetiracetam to one half of the 750 mg tablet twice daily. Keep taking the phenytoin and zonisamide the way you are now. Call if seizures worsen.    Sent in medication change to [facility].    Monty Merchant MD

## 2021-01-26 NOTE — LETTER
2021       RE: Charles Lugo  : 1955   MRN: [id number]      Dear Colleague,    Thank you for referring your patient, Charles Lugo, to the [facility] at [facility]. Please see a copy of my visit note below.    Charles is a 65 year old who is being evaluated via a billable video visit.      How would you like to obtain your AVS? Mail a copy  If the video visit is dropped, the invitation should be resent by: Send to e-mail at: [email or web address]    Will anyone else be joining your video visit? Nurse or caretaker       Please see physician note for televisit parameters.    Monty Merchant MD      [facility] Progress Note      Patient:  Charles Lugo  :  1955   Age:  65 year old   Today's Office Visit:  2021    Epilepsy Data:    Patient History  Primary Epileptologist/Provider: Monroe Escobedo M.D.  Patient Status: Chronic Intractable  Epilepsy Syndrome: Epilepsy unspecified  Epilepsy Syndrome Status: Undertermined - After Extensive Evaluation  Age of Onset: 4 years old  Other Relevant Dx/ Issues: severe developmental delay,      Tests/Surgery History  Last EE2017    Seizure Record  Current Visit Date: 21  Previous Visit Date: 20  Months since last visit: 2.73  Seizure Type 1: Unspecified Convulsion  Description of Sz Type 1: head will drop suddenly and then he may make some noises.  Then his arms will go up.  He will make fists with hands.  His eyes will roll back and sometimes he will have some leg kicking.  Then he will have convulsions with upper and lower extremities and his body.  Usually it lasts for 10 seconds to 45 seconds in duration  # of Type 1 Seizure since last visit: 0  Freq. 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  There was significant improvement with addition of  zonisamide and convulsive seizures became rare.  He started experiencing progressive weight loss more than a year following initiation of zonisamide. 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 He is DNR/DNI so primary care did not pursue further evaluation.    No seizures since last visit four months ago. This is an improvement compared to previous visit. More screaming.  We had reduced levetiracetam since last visit and this was not associated with seizure worsening.  Still taking po and staff reports reasonable appetite; no coughing when swallows.      Current Outpatient Medications   Medication Sig Dispense Refill     alendronate (FOSAMAX) 70 MG tablet Take 70 mg by mouth every 7 days       Ascorbic Acid (VITAMIN C PO) Take 500 mg by mouth daily       Calcium Carb-Cholecalciferol (CALCIUM-VITAMIN D) 600-400 MG-UNIT TABS        diazepam (VALIUM) 5 MG/ML (HIGH CONC) solution GIVE 1ML (5MG) BY MOUTH FOR GTC >5 MINUTES. IF CONTINUES FOR AN ADDITIONAL 5 MINUTES, CALL 911. MAX OF 2ML (10MG-2 DOSES) IN 24 HOURS. MONITOR BREATHING. *ORDER WHEN LOW* *5 TOTAL FILLS* 30 mL 1     FOLIC ACID PO Take 1 mg by mouth daily       gentian violet 1 % solution Take 0.5 mLs by mouth as needed       glycopyrrolate (ROBINUL) 1 MG tablet Take 1 mg by mouth 3 times daily as needed for secretions       haloperidol (HALDOL) 0.5 MG tablet Take 0.5 mg by mouth every 2 hours as needed for hallucinations, agitation or nausea       levETIRAcetam (KEPPRA) 750 MG tablet Take one tablet twice daily (total 1500 mg per day) 62 tablet 11     LEVOTHYROXINE SODIUM PO Take 88 mcg by mouth daily       LORazepam (ATIVAN) 0.5 MG tablet Take 0.5 mg by mouth every 4 hours as needed for anxiety, nausea, Insomnia or vomiting       Magnesium Hydroxide (MILK OF MAGNESIA PO)        PANTOPRAZOLE SODIUM PO Take 40 mg by mouth daily       phenylephrine-shark liver oil-mineral oil-petrolatum (PREPARATION H) 0.25-14-74.9 % rectal ointment Place  rectally 2 times daily as needed for hemorrhoids       phenytoin (DILANTIN) 100 MG capsule Take 1 cap po BID ( together with 30 mg cap as directed) 60 capsule 11     phenytoin (DILANTIN) 30 MG capsule One 30 mg tabs at bedtime with one 100 mg tab in the hs 31 capsule 11     Polysaccharide Iron Complex (FERREX 150 PO)        pramipexole (MIRAPEX) 0.125 MG tablet TAKE 1 TABLET BY MOUTH AT BEDTIME. 31 tablet 10     QUEtiapine (SEROQUEL) 25 MG tablet Take 25 mg by mouth every 2 hours as needed for agitation or hallucinations       QUEtiapine (SEROQUEL) 25 MG tablet Take 25 mg by mouth 3 times daily       study - bisacodyl (IDS# 4779) 10 MG Suppository Place 10 mg rectally daily as needed for constipation       zonisamide (ZONEGRAN) 100 MG capsule 300 mg at bedtime. 90 capsule 11     morphine 5 MG solu-tab Take 5 mg by mouth every hour as needed Shortness of breath          Medication Notes:    Taking dilantin (100, 30) 100-130; it appears he is phenytoin for 100 mg and dilantin for 30 mg.  Other AEDs as above.   Still taking aledronate.  AED Medication Compliance:  compliant all of the time  Using a pill box:  medications administered to patient    Review of Systems:  Still can pivot and transfer. Having bowel movements. No blood.  Staff denies hematuria. Staff denies cough or fever.  Have you experienced a traumatic fall since your last visit: NO  Are these falls related to your seizures: Not Applicable    Other Issues:  DNR/DNI.    Exam:    There were no vitals taken for this visit.     Wt Readings from Last 5 Encounters:   07/17/17 134 lb (60.8 kg)   04/03/17 139 lb 8 oz (63.3 kg)     Currently reported weight 86.7 lbs, down from 90 lbs four months ago. Limited exam on video today. He was sleeping with eyes closed. Staff woke him up. Eyes were conjugate and pupils appeared equal. He moved both arms with stimulation.    Results for NUSRAT GU (MRN [id number]) as of 1/26/2021 16:10   Ref. Range 6/11/2020 11:02    Keppra (Levetiracetam) Level Latest Ref Range: 12 - 46 ug/mL 65.6 (A)   Phenytoin Free Latest Ref Range: 1 - 2  1.5   Dilantin Latest Ref Range: 10 - 20 mg/L 14.9   Zonisamide Level Quant Latest Ref Range: 10 - 40 ug/mL 4.7 (A)     He was taking levetiracetam 3000 mg/d in Jun 2020.    IMPRESSION  1) Probable symptomatic generalized epilepsy with tonic and tonic clonic seizures. No seizures in last four months which is an improvement compared to previous interval. We had reduced levetiracetam from 3000 mg/d to 1500 mg/d in last interval. Not clear that levetiracetam is adding much to seizure control.  2) Continues with significant improvement in seizure frequency on zonisamide. Tho he is taking 300 mg per day his level in June was 4.7 which is subtherapeutic. Nonetheless has had good response.  3) Signficant weight loss which started more than a year after zonisamide initiated and so is unlikely related to this medication. Staff report he is DNR DNI and that working diagnosis is carcinoma. Evaluation reportedly held off given overall condition. Only levetiracetam level was increased following weight loss tho AED doses stable. Not clear that he requires aledronate in this setting and aledronate associated GI effects could conceivably be contributing to weight loss.     PLAN:  1) Same zonisamide. Reduce levetiracetam to 375 mg twice a day (total 750 mg per day or approx 20 mg/kg). Continue phenytoin to 230 mg/d (approx 6 mg/kg/d). Call if worsening seizures.  2) would be happy to suggest alternative medications if weight loss continued and primary care advocated strongly for pursuing this course.  3) Asked caregiver to connect with primary care and determine whether aledronate was needed here.  4) RTC 4 months. If continues doing well we could consider discontinuing levetiracetam.    VIDEO VISIT PARAMETERS  Video visit application used: [employer]  Patient or representative agreed to visit? Yes  Patient initiated visit:  Yes  Patient location during visit: group home  Physician location during visit: [facility]  Time of visit start: 1105 AM  Time of visit finish: 1125 AM    Time of in person visit: 20 min. Additional 5 min reviewing chart prior to visit, additional 12 min generating note after visit.   So spent total 37min on visit, all on day of in person visit.     Monty Merchant MD

## 2021-04-19 NOTE — TELEPHONE ENCOUNTER
zonisamide (ZONEGRAN) 100 MG capsule  Last Written Prescription Date:  4/9/20  Last Fill Quantity: 90,   # refills: 11  Last Office Visit : 1/26/21  Future Office visit:  5/26/21    Filling per [facility] medication refill protocols - seizure medications.  Not all labs required.

## 2021-05-14 NOTE — TELEPHONE ENCOUNTER
I call and left a message for  to call the clinic back with a fax number to fax to seizure plan back.

## 2021-06-08 NOTE — TELEPHONE ENCOUNTER
pramipexole (MIRAPEX) 0.125 MG tablet      TAKE 1 TABLET BY MOUTH AT BEDTIME.  Last Written Prescription Date:  6/25/20  Last Fill Quantity: 31,   # refills: 10  Last Office Visit : 1/26/21  RTC 4 months  Future Office visit:  none    Annual BP     Routing refill request to provider for review/approval because:  Per protocol, overdue BP.  Per last visit 1/26/21, RTC in 4 months.     Scheduling has been notified to contact the pt for appointment.

## 2021-06-09 NOTE — TELEPHONE ENCOUNTER
Chart reviewed for reports of blood pressure recordings    From the residence consultation/office visit documentation related to the  virtual visit 11/4/2020 scanned in to the EMR, the patient had a blood pressure recording of 120/68 on 11/1/2020    Will make sure MD is aware

## 2021-09-03 NOTE — TELEPHONE ENCOUNTER
Last Clinic Visit: 1/26/21 recommended 4 month follow up, no upcoming appointments scheduled.  30 day refill provided per protocol, routed to clinic scheduling for follow up

## 2021-09-25 NOTE — TELEPHONE ENCOUNTER
"   pramipexole (MIRAPEX) 0.125 MG tablet  Last Written Prescription Date:  9/3/21  Last Fill Quantity: 30,   # refills: 0  Last Office Visit : 1/26/21  Future Office visit:  None    \" RTC 4 months\"    Scheduling has been notified to contact the pt for appointment.      Routing refill request to provider for review/approval because: fyi. appt past due. 30 tabs sent to pharm.  "

## 2021-10-28 NOTE — PROGRESS NOTES
Charles is a 66 year old who is being evaluated via a billable video visit.      How would you like to obtain your AVS? Mail a copy  If the video visit is dropped, the invitation should be resent by: Text to cell phone: Call [phone]  [facility] Progress Note      Patient:  Charles Lugo  :  1955   Age:  66 year old   Today's Office Visit:  10/28/2021    Epilepsy Data:    Patient History  Primary Epileptologist/Provider: Monroe Escobedo M.D.  Patient Status: Chronic Intractable  Epilepsy Syndrome: Epilepsy unspecified  Epilepsy Syndrome Status: Undertermined - After Extensive Evaluation  Age of Onset: 4 years old  Other Relevant Dx/ Issues: severe developmental delay,      Tests/Surgery History  Last EE2017    Seizure Record  Current Visit Date: 10/28/21  Previous Visit Date: 21  Months since last visit: 9.03  Seizure Type 1: Unspecified Convulsion  Description of Sz Type 1: head will drop suddenly and then he may make some noises.  Then his arms will go up.  He will make fists with hands.  His eyes will roll back and sometimes he will have some leg kicking.  Then he will have convulsions with upper and lower extremities and his body.  Usually it lasts for 10 seconds to 45 seconds in duration  # of Type 1 Seizure since last visit: 2  Freq. Type 1 / Month: 0.22    History of Present Illness:     Video visit was arranged today. I have not seen this patient in person at any point.  Staff initially stated he was seizure free since last visit. Then reported he experienced two tonic seizures six months ago.   Took two to three days to return to normal. Staff report AED levels were checked but we do not have results.  Staff further reported that dilantin was apparently increased and levetiracetam was reduced by his internists.   His internists are prescribing and refilling his medications.    Staff further reports that weight loss continues. Internal medicine is not pursuing  further evaluation and suspect he has an undetected neoplasm.  However he continues eating well and has not succumbed in spite of continuing weight loss over the last four years    Current Outpatient Medications   Medication Sig Dispense Refill     Ascorbic Acid (VITAMIN C PO) Take 500 mg by mouth daily       Calcium Carb-Cholecalciferol (CALCIUM-VITAMIN D) 600-400 MG-UNIT TABS        cholecalciferol (VITAMIN D3) 25 mcg (1000 units) capsule Take 1 capsule by mouth daily       diazepam (VALIUM) 5 MG/ML (HIGH CONC) solution GIVE 1ML (5MG) BY MOUTH FOR GTC >5 MINUTES. IF CONTINUES FOR AN ADDITIONAL 5 MINUTES, CALL 911. MAX OF 2ML (10MG-2 DOSES) IN 24 HOURS. MONITOR BREATHING. *ORDER WHEN LOW* *5 TOTAL FILLS* 30 mL 1     FOLIC ACID PO Take 1 mg by mouth daily       gentian violet 1 % solution Take 0.5 mLs by mouth as needed       glycopyrrolate (ROBINUL) 1 MG tablet Take 1 mg by mouth 3 times daily as needed for secretions       haloperidol (HALDOL) 0.5 MG tablet Take 0.5 mg by mouth every 2 hours as needed for hallucinations, agitation or nausea       levETIRAcetam (KEPPRA) 750 MG tablet Take one half  tablet twice daily (total 750 mg per day) 31 tablet 11     levothyroxine (SYNTHROID/LEVOTHROID) 50 MCG tablet Take 50 mcg by mouth daily Take 1 tablet po daily on empty stomach at 6 am.       LORazepam (ATIVAN) 0.5 MG tablet Take 0.5 mg by mouth every 4 hours as needed for anxiety, nausea, Insomnia or vomiting       Magnesium Hydroxide (MILK OF MAGNESIA PO)        morphine 5 MG solu-tab Take 5 mg by mouth every hour as needed Shortness of breath       ondansetron (ZOFRAN-ODT) 4 MG ODT tab Take 4 mg by mouth every 8 hours as needed for nausea       phenylephrine-shark liver oil-mineral oil-petrolatum (PREPARATION H) 0.25-14-74.9 % rectal ointment Place rectally 2 times daily as needed for hemorrhoids       phenytoin (DILANTIN) 100 MG capsule Take 1 cap po BID ( together with 30 mg cap as directed) 60 capsule 11      phenytoin (DILANTIN) 30 MG capsule One 30 mg tabs at bedtime with one 100 mg tab in the hs (Patient taking differently: One 30 mg tabs at bedtime with one 100 mg tab twice a day) 31 capsule 11     Polysaccharide Iron Complex (FERREX 150 PO)        pramipexole (MIRAPEX) 0.125 MG tablet Take 1 tablet (0.125 mg) by mouth At Bedtime Call clinic to schedule follow up appointment. 30 tablet 0     QUEtiapine (SEROQUEL) 25 MG tablet Take 25 mg by mouth every 2 hours as needed for agitation or hallucinations       QUEtiapine (SEROQUEL) 25 MG tablet Take 25 mg by mouth 3 times daily       senna-docusate (SENOKOT-S/PERICOLACE) 8.6-50 MG tablet Take 1 tablet by mouth 2 times daily as needed for constipation       study - bisacodyl (IDS# 4779) 10 MG Suppository Place 10 mg rectally daily as needed for constipation       White Petrolatum ointment Apply topically daily Apple small amount to rectal area after every bowel movement.       zonisamide (ZONEGRAN) 100 MG capsule TAKE 3 CAPSULES (300MG) BY MOUTH AT BEDTIME. ([facility]) 90 capsule 9     alendronate (FOSAMAX) 70 MG tablet Take 70 mg by mouth every 7 days (Patient not taking: Reported on 10/28/2021)       LEVOTHYROXINE SODIUM PO Take 88 mcg by mouth daily (Patient not taking: Reported on 10/28/2021)       PANTOPRAZOLE SODIUM PO Take 40 mg by mouth daily (Patient not taking: Reported on 10/28/2021)          Medication Notes:    Taking phenytoin 100 mg twice a day. Taking dilantin 30 twice a day. This is an increase of 30 mg.  Levetiracetam was reduced to 375 mg twice a day. Still taking zonisamide 300 mg at bedtime.  AED Medication Compliance:  compliant all of the time  Medications are administered by staff.    Review of Systems:  No vomiting, diarrhea, fevers, hematuria or kidney stones.  Some constipation. No blood in the stool.  Staff reports that he has reasonable appetite and finishes all meals. No trouble swallowing.    Other Issues:    DNR DNI still. No evaluation  planned for weight loss. He does finish his meals. Swallowing OK.  Is patient safe to drive:  No    Exam:    There were no vitals taken for this visit.     Wt Readings from Last 5 Encounters:   07/17/17 134 lb (60.8 kg)   04/03/17 139 lb 8 oz (63.3 kg)     Down to 83 pounds. This is down from 88 pounds nine months ago.    Alert. Does not follow commands. Does not speak. Otherwise not examinable on video.    IMPRESSION  1) Probable symptomatic generalized epilepsy with tonic and tonic clonic seizures. Seizures seem under very good control compared to period prior to initiation of zonisamide. It has never been clear that levetiracetam is adding much to seizure control. He has had blood levels done and AEDs adjusted by his local internists and blood levels underlying these changes are not available at this time. Reductions of levetiracetam may have been appropriate given previous history but no primary data is available.  2) Continues with significant improvement in seizure frequency on zonisamide. Tho he is taking 300 mg per day his level in June was 4.7 which is subtherapeutic. Nonetheless has had good response.  3) Signficant weight loss which started more than a year after zonisamide initiated and so is unlikely related to this medication. Reasonable appetite also argues against ZNS as a cause but cannot exclude. Staff report he is DNR DNI and that working diagnosis is carcinoma; however he has not succumbed over the last two years. Evaluation reportedly held off given overall condition.    DISCUSSION  Discussed with sister who is his guardian. Stated frankly that local physicians were following levels, adjusting doses, and refilling AEDs so it was not clear that we were adding anything to his care. I was also uncomfortable prescribing medications not having examined the patient at any point in time. I suggested that it would be best that I sign off the case and refer care to his local physicians. Sister was OK  with this as long as I reviewed the labs which unfortunately were not available at the time of visit and reassured everyone that changes were appropriate. I stated I would be happy to do this but further suggested that this approach should avoided in the future. If a clinician is involved in patient's care, it is best that evaluation and treatment be reviewed with clinician at the time care is delivered rather than asking clinician to evaluate changes made by another clinician four months later.     PLAN:  1) Same AEDs. Group home understands that local practitioners are filling scripts at this point.  2) Would be happy to review labs once provided. Following this we will refer back to local care. If labs not provided in next week or so will review back to local care.  e) would be happy to suggest alternative medications if weight loss continued and primary care advocated strongly for pursuing this course.    Total time on video 22 minutes. 5 minutes reviewing chart prior to visit. 5 minutes generating note after visit. Total 32 minutes on day of visit.    Monty Merchant MD

## 2021-10-28 NOTE — TELEPHONE ENCOUNTER
Received medication consultation form. Form saved to R drive, encounter routed.   Allyssa Henry, EMT

## 2021-10-28 NOTE — LETTER
10/28/2021     RE: Charles Lugo  : 1955   MRN: [id number]      Dear Colleague,    Thank you for referring your patient, Charles Lugo, to the [facility] EPILEPSY CARE at [facility]. Please see a copy of my visit note below.    Charles is a 66 year old who is being evaluated via a billable video visit.      How would you like to obtain your AVS? Mail a copy  If the video visit is dropped, the invitation should be resent by: Text to cell phone: Call [phone]  [facility] Epilepsy [facility] Progress Note      Patient:  Charles Lugo  :  1955   Age:  66 year old   Today's Office Visit:  10/28/2021    Epilepsy Data:    Patient History  Primary Epileptologist/Provider: Monroe Escobedo M.D.  Patient Status: Chronic Intractable  Epilepsy Syndrome: Epilepsy unspecified  Epilepsy Syndrome Status: Undertermined - After Extensive Evaluation  Age of Onset: 4 years old  Other Relevant Dx/ Issues: severe developmental delay,      Tests/Surgery History  Last [id number]    Seizure Record  Current Visit Date: 10/28/21  Previous Visit Date: 21  Months since last visit: 9.03  Seizure Type 1: Unspecified Convulsion  Description of Sz Type 1: head will drop suddenly and then he may make some noises.  Then his arms will go up.  He will make fists with hands.  His eyes will roll back and sometimes he will have some leg kicking.  Then he will have convulsions with upper and lower extremities and his body.  Usually it lasts for 10 seconds to 45 seconds in duration  # of Type 1 Seizure since last visit: 2  Freq. Type 1 / Month: 0.22    History of Present Illness:     Video visit was arranged today. I have not seen this patient in person at any point.  Staff initially stated he was seizure free since last visit. Then reported he experienced two tonic seizures six months ago.   Took two to three days to return to normal. Staff report AED levels were checked but we do  not have results.  Staff further reported that dilantin was apparently increased and levetiracetam was reduced by his internists.   His internists are prescribing and refilling his medications.    Staff further reports that weight loss continues. Internal medicine is not pursuing further evaluation and suspect he has an undetected neoplasm.  However he continues eating well and has not succumbed in spite of continuing weight loss over the last four years    Current Outpatient Medications   Medication Sig Dispense Refill     Ascorbic Acid (VITAMIN C PO) Take 500 mg by mouth daily       Calcium Carb-Cholecalciferol (CALCIUM-VITAMIN D) 600-400 MG-UNIT TABS        cholecalciferol (VITAMIN D3) 25 mcg (1000 units) capsule Take 1 capsule by mouth daily       diazepam (VALIUM) 5 MG/ML (HIGH CONC) solution GIVE 1ML (5MG) BY MOUTH FOR GTC >5 MINUTES. IF CONTINUES FOR AN ADDITIONAL 5 MINUTES, CALL 911. MAX OF 2ML (10MG-2 DOSES) IN 24 HOURS. MONITOR BREATHING. *ORDER WHEN LOW* *5 TOTAL FILLS* 30 mL 1     FOLIC ACID PO Take 1 mg by mouth daily       gentian violet 1 % solution Take 0.5 mLs by mouth as needed       glycopyrrolate (ROBINUL) 1 MG tablet Take 1 mg by mouth 3 times daily as needed for secretions       haloperidol (HALDOL) 0.5 MG tablet Take 0.5 mg by mouth every 2 hours as needed for hallucinations, agitation or nausea       levETIRAcetam (KEPPRA) 750 MG tablet Take one half  tablet twice daily (total 750 mg per day) 31 tablet 11     levothyroxine (SYNTHROID/LEVOTHROID) 50 MCG tablet Take 50 mcg by mouth daily Take 1 tablet po daily on empty stomach at 6 am.       LORazepam (ATIVAN) 0.5 MG tablet Take 0.5 mg by mouth every 4 hours as needed for anxiety, nausea, Insomnia or vomiting       Magnesium Hydroxide (MILK OF MAGNESIA PO)        morphine 5 MG solu-tab Take 5 mg by mouth every hour as needed Shortness of breath       ondansetron (ZOFRAN-ODT) 4 MG ODT tab Take 4 mg by mouth every 8 hours as needed for nausea        phenylephrine-shark liver oil-mineral oil-petrolatum (PREPARATION H) 0.25-14-74.9 % rectal ointment Place rectally 2 times daily as needed for hemorrhoids       phenytoin (DILANTIN) 100 MG capsule Take 1 cap po BID ( together with 30 mg cap as directed) 60 capsule 11     phenytoin (DILANTIN) 30 MG capsule One 30 mg tabs at bedtime with one 100 mg tab in the hs (Patient taking differently: One 30 mg tabs at bedtime with one 100 mg tab twice a day) 31 capsule 11     Polysaccharide Iron Complex (FERREX 150 PO)        pramipexole (MIRAPEX) 0.125 MG tablet Take 1 tablet (0.125 mg) by mouth At Bedtime Call clinic to schedule follow up appointment. 30 tablet 0     QUEtiapine (SEROQUEL) 25 MG tablet Take 25 mg by mouth every 2 hours as needed for agitation or hallucinations       QUEtiapine (SEROQUEL) 25 MG tablet Take 25 mg by mouth 3 times daily       senna-docusate (SENOKOT-S/PERICOLACE) 8.6-50 MG tablet Take 1 tablet by mouth 2 times daily as needed for constipation       study - bisacodyl (IDS# 4779) 10 MG Suppository Place 10 mg rectally daily as needed for constipation       White Petrolatum ointment Apply topically daily Apple small amount to rectal area after every bowel movement.       zonisamide (ZONEGRAN) 100 MG capsule TAKE 3 CAPSULES (300MG) BY MOUTH AT BEDTIME. ([facility]) 90 capsule 9     alendronate (FOSAMAX) 70 MG tablet Take 70 mg by mouth every 7 days (Patient not taking: Reported on 10/28/2021)       LEVOTHYROXINE SODIUM PO Take 88 mcg by mouth daily (Patient not taking: Reported on 10/28/2021)       PANTOPRAZOLE SODIUM PO Take 40 mg by mouth daily (Patient not taking: Reported on 10/28/2021)          Medication Notes:    Taking phenytoin 100 mg twice a day. Taking dilantin 30 twice a day. This is an increase of 30 mg.  Levetiracetam was reduced to 375 mg twice a day. Still taking zonisamide 300 mg at bedtime.  AED Medication Compliance:  compliant all of the time  Medications are administered by  staff.    Review of Systems:  No vomiting, diarrhea, fevers, hematuria or kidney stones.  Some constipation. No blood in the stool.  Staff reports that he has reasonable appetite and finishes all meals. No trouble swallowing.    Other Issues:    DNR DNI still. No evaluation planned for weight loss. He does finish his meals. Swallowing OK.  Is patient safe to drive:  No    Exam:    There were no vitals taken for this visit.     Wt Readings from Last 5 Encounters:   07/17/17 134 lb (60.8 kg)   04/03/17 139 lb 8 oz (63.3 kg)     Down to 83 pounds. This is down from 88 pounds nine months ago.    Alert. Does not follow commands. Does not speak. Otherwise not examinable on video.    IMPRESSION  1) Probable symptomatic generalized epilepsy with tonic and tonic clonic seizures. Seizures seem under very good control compared to period prior to initiation of zonisamide. It has never been clear that levetiracetam is adding much to seizure control. He has had blood levels done and AEDs adjusted by his local internists and blood levels underlying these changes are not available at this time. Reductions of levetiracetam may have been appropriate given previous history but no primary data is available.  2) Continues with significant improvement in seizure frequency on zonisamide. Tho he is taking 300 mg per day his level in June was 4.7 which is subtherapeutic. Nonetheless has had good response.  3) Signficant weight loss which started more than a year after zonisamide initiated and so is unlikely related to this medication. Reasonable appetite also argues against ZNS as a cause but cannot exclude. Staff report he is DNR DNI and that working diagnosis is carcinoma; however he has not succumbed over the last two years. Evaluation reportedly held off given overall condition.    DISCUSSION  Discussed with sister who is his guardian. Stated frankly that local physicians were following levels, adjusting doses, and refilling AEDs so it  was not clear that we were adding anything to his care. I was also uncomfortable prescribing medications not having examined the patient at any point in time. I suggested that it would be best that I sign off the case and refer care to his local physicians. Sister was OK with this as long as I reviewed the labs which unfortunately were not available at the time of visit and reassured everyone that changes were appropriate. I stated I would be happy to do this but further suggested that this approach should avoided in the future. If a clinician is involved in patient's care, it is best that evaluation and treatment be reviewed with clinician at the time care is delivered rather than asking clinician to evaluate changes made by another clinician four months later.     PLAN:  1) Same AEDs. Group home understands that local practitioners are filling scripts at this point.  2) Would be happy to review labs once provided. Following this we will refer back to local care. If labs not provided in next week or so will review back to local care.  e) would be happy to suggest alternative medications if weight loss continued and primary care advocated strongly for pursuing this course.    Total time on video 22 minutes. 5 minutes reviewing chart prior to visit. 5 minutes generating note after visit. Total 32 minutes on day of visit.    Monty Merchant MD

## 2021-10-31 NOTE — TELEPHONE ENCOUNTER
Zonisamide 100 MG Capsule      Last Written Prescription Date: 4/19/21  Last Fill Quantity: 90,   # refills: 9  Last Office Visit : 10/28/21  Future Office visit:  none    Routing refill request to provider for review/approval because:  Per [facility], should we be filling prescriptions?  Also, pt should have enough med until 1/2022  Thank-you, Dee VILLAGOMEZ RN Medication Refill Team

## 2021-11-01 NOTE — TELEPHONE ENCOUNTER
Form prepared with information from virtual visit note on 10/28/21. It was provided to the physician for final review and signature.

## 2021-11-02 NOTE — TELEPHONE ENCOUNTER
Pharmacy called and said they don't have refills left on RX. Asked for new RX to be sent, said what they received back in April was refills for 15 day supplies.